# Patient Record
Sex: MALE | NOT HISPANIC OR LATINO | Employment: FULL TIME | URBAN - METROPOLITAN AREA
[De-identification: names, ages, dates, MRNs, and addresses within clinical notes are randomized per-mention and may not be internally consistent; named-entity substitution may affect disease eponyms.]

---

## 2017-01-12 ENCOUNTER — THERAPY VISIT (OUTPATIENT)
Dept: PHYSICAL THERAPY | Facility: CLINIC | Age: 27
End: 2017-01-12
Payer: COMMERCIAL

## 2017-01-12 DIAGNOSIS — M25.811 SHOULDER IMPINGEMENT, RIGHT: Primary | ICD-10-CM

## 2017-01-12 PROCEDURE — 97161 PT EVAL LOW COMPLEX 20 MIN: CPT | Mod: GP | Performed by: PHYSICAL THERAPIST

## 2017-01-12 PROCEDURE — 97035 APP MDLTY 1+ULTRASOUND EA 15: CPT | Mod: GP | Performed by: PHYSICAL THERAPIST

## 2017-01-12 PROCEDURE — 97110 THERAPEUTIC EXERCISES: CPT | Mod: GP | Performed by: PHYSICAL THERAPIST

## 2017-01-12 NOTE — PROGRESS NOTES
[facility] Initial Evaluation    Subjective:    John Roman is a 26 year old male with a right shoulder condition.  Condition occurred with:  Lifting.  Condition occurred: at work.  This is a new condition  Lifting at work and felt pain in shoulder.  Light weight.  About 4 weeks ago.  MD referral 1/5/2017..    Patient reports pain:  Anterior, posterior, medial, lateral and upper trap.  Radiates to:  Cervical and upper arm.  Pain is described as aching and sharp and is intermittent and reported as 4/10.  Associated symptoms:  Loss of strength and loss of motion/stiffness. Pain is worse during the day.  Symptoms are exacerbated by using arm overhead (computer x 15 min) and relieved by rest.  Since onset symptoms are unchanged.        General health as reported by patient is good.  Pertinent medical history includes:  None.    Other surgeries include:  None reported.  Current medications:  None as reported by the patient.  Current occupation  at [employer].  Patient is working in normal job without restrictions.          Red flags:  None as reported by the patient.                      Objective:    Standing Alignment:      Shoulder/UE:  Rounded shoulders and elevated scapula R                                       Shoulder Evaluation:  ROM:  AROM:    Flexion:  Right:  Full with pain > 120    Abduction:  Right:  Full with pain > 90    Internal Rotation:  Right:  Full with pain @ belt                  PROM:  normal  Flexion:  Right: pain > 120      Abduction:  Right:  Pain > 120                          Strength:  : supraspinatus 4/5 with pain.  Flexion: Right: 5/5     Pain:   Extension:  Right: 5/5    Pain:    Adduction:  Right: 5/5     Pain:  Internal Rotation:  Right: 5/5     Pain:  External Rotation:   Right:5/5     Pain:              Stability Testing:  normal      Special Tests:      Right shoulder positive for the following special tests:Impingement  Right shoulder negative for the  following special tests:Labral; Bursal and Rotator cuff tear  Palpation:      Right shoulder tenderness present at: Acrimioclavicular; Supraspinatus; Deltoid; Levator; Rhomboids; Upper Trap and Bicipital Groove                                     General     ROS    Assessment/Plan:      Patient is a 26 year old male with right side shoulder complaints.    Patient has the following significant findings with corresponding treatment plan.                Diagnosis 1:  R shoulder impingement  Pain -  US, manual therapy, self management, education, directional preference exercise and home program  Decreased ROM/flexibility - manual therapy and therapeutic exercise  Decreased strength - therapeutic exercise and therapeutic activities  Inflammation - US  Decreased function - therapeutic activities    Therapy Evaluation Codes:   1) History comprised of:   Personal factors that impact the plan of care:      slight language baaier.    Comorbidity factors that impact the plan of care are:      None.     Medications impacting care: None.  2) Examination of Body Systems comprised of:   Body structures and functions that impact the plan of care:      Shoulder.   Activity limitations that impact the plan of care are:      Lifting and Reading/Computer work.  3) Clinical presentation characteristics are:   Stable/Uncomplicated.  4) Decision-Making    Low complexity using standardized patient assessment instrument and/or measureable assessment of functional outcome.  Cumulative Therapy Evaluation is: Low complexity.    Previous and current functional limitations:  (See Goal Flow Sheet for this information)    Short term and Long term goals: (See Goal Flow Sheet for this information)     Communication ability:  Patient appears to be able to clearly communicate and understand verbal and written communication and follow directions correctly.  Treatment Explanation - The following has been discussed with the patient:   RX ordered/plan of  care  Anticipated outcomes  Possible risks and side effects  This patient would benefit from PT intervention to resume normal activities.   Rehab potential is good.    Frequency:  1 X week, once daily  Duration:  for 6 weeks  Discharge Plan:  Achieve all LTG.  Independent in home treatment program.  Reach maximal therapeutic benefit.    Please refer to the daily flowsheet for treatment today, total treatment time and time spent performing 1:1 timed codes.

## 2017-01-12 NOTE — Clinical Note
[facility]  [address] [address]  [phone]    2017    Re: John Roman   :   1990  MRN:  [id number]   REFERRING PHYSICIAN:   Farhana Greene  [facility] [facility]  2017  Visits: 1 Rxs Used: 1/  Reason for Referral:  Shoulder impingement, right  EVALUATION SUMMARY  [facility] Initial Evaluation  Subjective:  John Roman is a 26 year old male with a right shoulder condition.  Condition occurred with:  Lifting.  Condition occurred: at work.  This is a new condition  Lifting at work and felt pain in shoulder.  Light weight.  About 4 weeks ago.  MD referral 2017..    Patient reports pain:  Anterior, posterior, medial, lateral and upper trap.  Radiates to:  Cervical and upper arm.  Pain is described as aching and sharp and is intermittent and reported as 4/10.  Associated symptoms:  Loss of strength and loss of motion/stiffness. Pain is worse during the day.  Symptoms are exacerbated by using arm overhead (computer x 15 min) and relieved by rest.  Since onset symptoms are unchanged.        General health as reported by patient is good.  Pertinent medical history includes:  None.Other surgeries include:  None reported.  Current medications:  None as reported by the patient.  Current occupation  at [employer].  Patient is working in normal job without restrictions.    Red flags:  None as reported by the patient.  Objective:  Standing Alignment:    Shoulder/UE:  Rounded shoulders and elevated scapula R    Shoulder Evaluation:  ROM:  AROM:    Flexion:  Right:  Full with pain > 120  Abduction:  Right:  Full with pain > 90  Internal Rotation:  Right:  Full with pain @ belt  PROM:  normal  Flexion:  Right: pain > 120    Abduction:  Right:  Pain > 120  Strength:  : supraspinatus 4/5 with pain.  Flexion: Right: 5/5     Pain:   Extension:  Right: 5/5    Pain:  Adduction:   Right: 5/5     Pain:  Internal Rotation:  Right: 5/5     Pain:  External Rotation:   Right:5/5     Pain:    Stability Testing:  normal    Re: John Roman   :   1990  Special Tests:    Right shoulder positive for the following special tests:Impingement  Right shoulder negative for the following special tests:Labral; Bursal and Rotator cuff tear  Palpation:    Right shoulder tenderness present at: Acrimioclavicular; Supraspinatus; Deltoid; Levator; Rhomboids; Upper Trap and Bicipital Groove  General   ROS  Assessment/Plan:    Patient is a 26 year old male with right side shoulder complaints.    Patient has the following significant findings with corresponding treatment plan.                Diagnosis 1:  R shoulder impingement  Pain -  US, manual therapy, self management, education, directional preference exercise and home program  Decreased ROM/flexibility - manual therapy and therapeutic exercise  Decreased strength - therapeutic exercise and therapeutic activities  Inflammation - US  Decreased function - therapeutic activities  Therapy Evaluation Codes:   1) History comprised of:   Personal factors that impact the plan of care:      slight language baaier.    Comorbidity factors that impact the plan of care are:      None.     Medications impacting care: None.  2) Examination of Body Systems comprised of:   Body structures and functions that impact the plan of care:      Shoulder.   Activity limitations that impact the plan of care are:      Lifting and Reading/Computer work.  3) Clinical presentation characteristics are:   Stable/Uncomplicated.  4) Decision-Making    Low complexity using standardized patient assessment instrument and/or measureable assessment of functional outcome.  Cumulative Therapy Evaluation is: Low complexity.  Previous and current functional limitations:  (See Goal Flow Sheet for this information)    Short term and Long term goals: (See Goal Flow Sheet for this information)    Communication ability:  Patient appears to be able to clearly communicate and understand verbal and written communication and follow directions correctly.  Treatment Explanation - The following has been discussed with the patient:   RX ordered/plan of care  Anticipated outcomes  Possible risks and side effects  This patient would benefit from PT intervention to resume normal activities.   Rehab potential is good.  Frequency:  1 X week, once daily  Duration:  for 6 weeks    Re: John Roman   :   1990      Discharge Plan:  Achieve all LTG.  Independent in home treatment program.  Reach maximal therapeutic benefit.  Thank you for your referral.    INQUIRIES  Therapist:Maurizio Horvath PT  [facility]  [address] [address]  Phone: [phone]  Fax: [phone]

## 2017-06-15 PROBLEM — M25.811 SHOULDER IMPINGEMENT, RIGHT: Status: RESOLVED | Noted: 2017-01-12 | Resolved: 2017-06-15

## 2020-12-13 ENCOUNTER — VIRTUAL VISIT (OUTPATIENT)
Dept: FAMILY MEDICINE | Facility: OTHER | Age: 30
End: 2020-12-13

## 2020-12-13 ENCOUNTER — NURSE TRIAGE (OUTPATIENT)
Dept: NURSING | Facility: CLINIC | Age: 30
End: 2020-12-13

## 2020-12-13 NOTE — TELEPHONE ENCOUNTER
Exposure to COVID about four days ago, just found out and is noting that he is now coughing too. Feeling okay otherwise at this time. He tried to do [facility] but was not able to. Gave him info re [facility],care and testing.       COVID 19 Nurse Triage Plan/Patient Instructions    Please be aware that novel coronavirus (COVID-19) may be circulating in the community. If you develop symptoms such as fever, cough, or SOB or if you have concerns about the presence of another infection including coronavirus (COVID-19), please contact your health care provider or visit [email or web address].     Disposition/Instructions    Virtual Visit with provider recommended. Reference Visit Selection Guide.    Thank you for taking steps to prevent the spread of this virus.  o Limit your contact with others.  o Wear a simple mask to cover your cough.  o Wash your hands well and often.    Resources    [facility]: About COVID-19: [email or web address]    CDC: What to Do If You're Sick: www.cdc.gov/coronavirus/2019-ncov/about/steps-when-sick.html    CDC: Ending Home Isolation: www.cdc.gov/coronavirus/2019-ncov/hcp/disposition-in-home-patients.html     CDC: Caring for Someone: www.cdc.gov/coronavirus/2019-ncov/if-you-are-sick/care-for-someone.html     [facility]: Interim Guidance for Hospital Discharge to Home: www.health.[facility].mn.us/diseases/coronavirus/hcp/hospdischarge.pdf    [facility] clinical trials (COVID-19 research studies): [email or web address]-trials     Below are the COVID-19 hotlines at the [place] [facility] of [facility] ([facility]). Interpreters are available.   o For health questions: Call 847-686-8613 or 1-267.411.9642 (7 a.m. to 7 p.m.)  o For questions about schools and childcare: Call 530-647-0991 or 1-405.350.1851 (7 a.m. to 7 p.m.)                     Reason for Disposition    [1] COVID-19 infection suspected by caller or triager AND [2] mild symptoms (cough, fever, or others) AND [3] no  complications or SOB    Additional Information    Negative: SEVERE difficulty breathing (e.g., struggling for each breath, speaks in single words)    Negative: Difficult to awaken or acting confused (e.g., disoriented, slurred speech)    Negative: Bluish (or gray) lips or face now    Negative: Shock suspected (e.g., cold/pale/clammy skin, too weak to stand, low BP, rapid pulse)    Negative: Sounds like a life-threatening emergency to the triager    Negative: [1] COVID-19 exposure AND [2] no symptoms    Negative: [1] Lives with someone known to have influenza (flu test positive) AND [2] flu-like symptoms (e.g., cough, runny nose, sore throat, SOB; with or without fever)    Negative: [1] Adult with possible COVID-19 symptoms AND [2] triager concerned about severity of symptoms or other causes    Negative: Immunization reaction suspected (e.g., fever, headache, muscle aches occurring during days 1-3 days after immunization)    Negative: COVID-19 and breastfeeding, questions about    Negative: SEVERE or constant chest pain or pressure (Exception: mild central chest pain, present only when coughing)    Negative: MODERATE difficulty breathing (e.g., speaks in phrases, SOB even at rest, pulse 100-120)    Negative: [1] Headache AND [2] stiff neck (can't touch chin to chest)    Negative: MILD difficulty breathing (e.g., minimal/no SOB at rest, SOB with walking, pulse <100)    Negative: Chest pain or pressure    Negative: Patient sounds very sick or weak to the triager    Negative: Fever > 103 F (39.4 C)    Negative: [1] Fever > 101 F (38.3 C) AND [2] age > 60    Negative: [1] Fever > 100.0 F (37.8 C) AND [2] bedridden (e.g., nursing home patient, CVA, chronic illness, recovering from surgery)    Negative: [1] HIGH RISK patient (e.g., age > 64 years, diabetes, heart or lung disease, weak immune system) AND [2] new or worsening symptoms    Negative: [1] HIGH RISK patient AND [2] influenza is widespread in the community AND [3]  ONE OR MORE respiratory symptoms: cough, sore throat, runny or stuffy nose    Negative: [1] HIGH RISK patient AND [2] influenza exposure within the last 7 days AND [3] ONE OR MORE respiratory symptoms: cough, sore throat, runny or stuffy nose    Negative: Fever present > 3 days (72 hours)    Negative: [1] Fever returns after gone for over 24 hours AND [2] symptoms worse or not improved    Negative: [1] Continuous (nonstop) coughing interferes with work or school AND [2] no improvement using cough treatment per protocol    Protocols used: CORONAVIRUS (COVID-19) DIAGNOSED OR [id number] 12.1

## 2020-12-13 NOTE — PROGRESS NOTES
"Date: 2020 16:16:54  Clinician: Kindra Adams  Clinician NPI: [id number]  Patient: akhil garcia  Patient : 1990  Patient Address: [address] [place] [address]  Patient Phone: [phone]  Visit Protocol: URI  Patient Summary:  akhil is a 30 year old ( : 1990 ) male who initiated a OnCare Visit for COVID-19 (Coronavirus) evaluation and screening. When asked the question \"Please sign me up to receive news, health information and promotions. \", akhil responded \"No\".    akhil states his symptoms started 1-2 days ago.   His symptoms consist of a headache, a cough, vomiting, rhinitis, facial pain or pressure, myalgia, chills, malaise, ageusia, and anosmia.   Symptom details     Nasal secretions: The color of his mucus is yellow.    Cough: akhil coughs a few times an hour and his cough is more bothersome at night. Phlegm comes into his throat when he coughs. He believes his cough is caused by post-nasal drip. The color of the phlegm is white.     Facial pain or pressure: The facial pain or pressure feels worse when bending over or leaning forward.     Headache: He states the headache is moderate (4-6 on a 10 point pain scale).      akhil denies having ear pain, wheezing, fever, nasal congestion, nausea, sore throat, teeth pain, and diarrhea. He also denies taking antibiotic medication in the past month, having recent facial or sinus surgery in the past 60 days, and having a sinus infection within the past year.   Precipitating events  He has recently been exposed to someone with influenza. akhil has not been in close contact with any high risk individuals.   Pertinent COVID-19 (Coronavirus) information  akhil does not work or volunteer as healthcare worker or a . In the past 14 days, akhil has not worked or volunteered at a healthcare facility or group living setting.   In the past 14 days, he also has not lived in a congregate living setting.   akhil has had a close " contact with a laboratory-confirmed COVID-19 patient within 14 days of symptom onset. He was exposed at his work. He does not know when he was exposed to the laboratory-confirmed COVID-19 patient.   Additional information about contact with COVID-19 (Coronavirus) patient as reported by the patient (free text): I believe from my friend    Since December 2019, akhil has not been tested for COVID-19 and has had upper respiratory infection (URI) or influenza-like illness.      Date(s) of previous URI or influenza-like illness (free-text): 12/10/2020     Symptoms akhil experienced during previous URI or influenza-like illness as reported by the patient (free-text): Flu        Triage Point(s) temporarily suspended for COVID-19 (Coronavirus) screening  akhil reported the following symptoms/conditions. These are protocol referral points that have temporarily been removed for purposes of COVID-19 (Coronavirus) screening.   Difficulty breathing even when resting and can only speak in phrase(s)   Pertinent medical history  He has not been told by his provider to avoid NSAIDs.   akhil does not have diabetes. He denies having immunosuppressive conditions (e.g., chemotherapy, HIV, organ transplant, long-term use of steroids or other immunosuppressive medications, splenectomy). He does not have severe COPD and congestive heart failure. He does not have asthma.   akhil needs a return to work/school note.   Weight: 185 lbs   akhil smokes or uses smokeless tobacco.   Weight: 185 lbs  A synchronous phone visit was initiated by the provider for the following reason: needs further evaluation of shortness of breath    MEDICATIONS: No current medications, ALLERGIES: NKDA  Clinician Response:  Dear akhil,  I am sorry you are not feeling well. To determine the most appropriate care for you, I would like you to be seen in person to further discuss your health history and symptoms.  You will not be charged for this OnCare Visit. Thank you for  trusting us with your care.  COVID-19 (Coronavirus) General Information  Because there is currently no vaccine to prevent infection, the best way to protect yourself is to avoid being exposed to this virus. Common symptoms of COVID-19 include but are not limited to fever, cough, and shortness of breath. These symptoms appear 2-14 days after you are exposed to the virus that causes COVID-19. Click here for more information from the CDC on how to protect yourself.  If you are sick with COVID-19 or suspect you are infected with the virus that causes COVID-19, follow the steps here from the CDC to help prevent the disease from spreading to people in your home and community.  Click here for general information from the CDC on testing.  If you develop any of these emergency warning signs for COVID-19, get medical attention immediately:     Trouble breathing    Persistent pain or pressure in the chest    New confusion or inability to arouse    Bluish lips or face      Call your doctor or clinic before going in. Call 911 if you have a medical emergency and notify the  you have or think you may have COVID-19.  For more detailed and up to date information on COVID-19 (Coronavirus), please visit the CDC website.   Diagnosis: Refer for additional evaluation  Diagnosis ICD: R69  Triage Notes: I reviewed the patient's history, verified their identity, and explained the OnCare Visit process.    Patient reports moderate shortness of breath even at rest and he would like COVID testing. Discussed this is his 5th Oncare visit today and he has been referred for in person evaluation 4 times already today due to his shortness of breath at rest. Recommended in person evaluation in urgent care or emergency room today and patient is agreeable.  Synchronous Triage: phone, status: completed, duration: 141 seconds

## 2020-12-27 ENCOUNTER — HEALTH MAINTENANCE LETTER (OUTPATIENT)
Age: 30
End: 2020-12-27

## 2021-10-04 ENCOUNTER — HEALTH MAINTENANCE LETTER (OUTPATIENT)
Age: 31
End: 2021-10-04

## 2022-01-23 ENCOUNTER — HEALTH MAINTENANCE LETTER (OUTPATIENT)
Age: 32
End: 2022-01-23

## 2022-09-11 ENCOUNTER — HEALTH MAINTENANCE LETTER (OUTPATIENT)
Age: 32
End: 2022-09-11

## 2022-10-01 ENCOUNTER — OFFICE VISIT (OUTPATIENT)
Dept: URGENT CARE | Facility: URGENT CARE | Age: 32
End: 2022-10-01
Payer: COMMERCIAL

## 2022-10-01 VITALS
OXYGEN SATURATION: 98 % | DIASTOLIC BLOOD PRESSURE: 75 MMHG | WEIGHT: 196.8 LBS | TEMPERATURE: 97 F | SYSTOLIC BLOOD PRESSURE: 111 MMHG | HEART RATE: 60 BPM

## 2022-10-01 DIAGNOSIS — R07.0 THROAT PAIN: ICD-10-CM

## 2022-10-01 DIAGNOSIS — B34.9 VIRAL SYNDROME: Primary | ICD-10-CM

## 2022-10-01 DIAGNOSIS — R52 BODY ACHES: ICD-10-CM

## 2022-10-01 LAB
DEPRECATED S PYO AG THROAT QL EIA: NEGATIVE
FLUAV AG SPEC QL IA: NEGATIVE
FLUBV AG SPEC QL IA: NEGATIVE
GROUP A STREP BY PCR: NOT DETECTED

## 2022-10-01 PROCEDURE — U0003 INFECTIOUS AGENT DETECTION BY NUCLEIC ACID (DNA OR RNA); SEVERE ACUTE RESPIRATORY SYNDROME CORONAVIRUS 2 (SARS-COV-2) (CORONAVIRUS DISEASE [COVID-19]), AMPLIFIED PROBE TECHNIQUE, MAKING USE OF HIGH THROUGHPUT TECHNOLOGIES AS DESCRIBED BY CMS-2020-01-R: HCPCS | Performed by: INTERNAL MEDICINE

## 2022-10-01 PROCEDURE — 99203 OFFICE O/P NEW LOW 30 MIN: CPT | Mod: CS | Performed by: INTERNAL MEDICINE

## 2022-10-01 PROCEDURE — 87651 STREP A DNA AMP PROBE: CPT | Performed by: INTERNAL MEDICINE

## 2022-10-01 PROCEDURE — 87804 INFLUENZA ASSAY W/OPTIC: CPT | Performed by: INTERNAL MEDICINE

## 2022-10-01 PROCEDURE — U0005 INFEC AGEN DETEC AMPLI PROBE: HCPCS | Performed by: INTERNAL MEDICINE

## 2022-10-01 ASSESSMENT — ENCOUNTER SYMPTOMS
DIARRHEA: 0
CHILLS: 0
DIAPHORESIS: 0
FEVER: 0

## 2022-10-01 NOTE — PROGRESS NOTES
ASSESSMENT AND PLAN:      ICD-10-CM    1. Viral syndrome  B34.9    2. Throat pain  R07.0 Streptococcus A Rapid Screen w/Reflex to PCR - Clinic Collect     Group A Streptococcus PCR Throat Swab   3. Body aches  R52 Influenza A & B Antigen - Clinic Collect     Symptomatic; Yes; 9/24/2022 COVID-19 Virus (Coronavirus) by PCR Nose     PLAN:  URI Adult:  Fluids, Rest and Saline gargles      Return if symptoms worsen or fail to improve.        Cuca Rosenbaum MD  [facility] URGENT CARE    Subjective     John Roman is a 32 year old who presents for Patient presents with:  Throat Pain: X1 week  Cough  Headache  Generalized Body Aches    an established patient of [facility].    URI Adult    Onset of symptoms was 1 week(s) ago.  Current and Associated symptoms: runny nose, stuffy nose, cough - non-productive, sore throat, hoarse voice, headache and body aches  Predisposing factors include ill contact: Family member  and Work.        Review of Systems   Constitutional: Negative for chills, diaphoresis and fever.   Gastrointestinal: Negative for diarrhea.           Objective    /75   Pulse 60   Temp 97  F (36.1  C) (Tympanic)   Wt 89.3 kg (196 lb 12.8 oz)   SpO2 98%   Physical Exam  Vitals reviewed.   Constitutional:       Appearance: Normal appearance.   HENT:      Right Ear: Tympanic membrane normal. There is impacted cerumen.      Left Ear: Tympanic membrane normal. There is impacted cerumen.      Mouth/Throat:      Mouth: Mucous membranes are moist.      Pharynx: Oropharynx is clear.   Eyes:      Conjunctiva/sclera: Conjunctivae normal.   Cardiovascular:      Rate and Rhythm: Normal rate and regular rhythm.      Pulses: Normal pulses.      Heart sounds: Normal heart sounds.   Pulmonary:      Effort: Pulmonary effort is normal.      Breath sounds: Normal breath sounds.   Neurological:      Mental Status: He is alert.            Results for orders placed or performed in visit on 10/01/22 (from the past  24 hour(s))   Streptococcus A Rapid Screen w/Reflex to PCR - Clinic Collect    Specimen: Throat; Swab   Result Value Ref Range    Group A Strep antigen Negative Negative   Influenza A & B Antigen - Clinic Collect    Specimen: Nose; Swab   Result Value Ref Range    Influenza A antigen Negative Negative    Influenza B antigen Negative Negative    Narrative    Test results must be correlated with clinical data. If necessary, results should be confirmed by a molecular assay or viral culture.

## 2022-10-02 LAB — SARS-COV-2 RNA RESP QL NAA+PROBE: NEGATIVE

## 2023-04-07 ENCOUNTER — OFFICE VISIT (OUTPATIENT)
Dept: URGENT CARE | Facility: URGENT CARE | Age: 33
End: 2023-04-07
Payer: COMMERCIAL

## 2023-04-07 VITALS
DIASTOLIC BLOOD PRESSURE: 72 MMHG | HEART RATE: 51 BPM | SYSTOLIC BLOOD PRESSURE: 119 MMHG | TEMPERATURE: 97.2 F | WEIGHT: 199 LBS | OXYGEN SATURATION: 100 %

## 2023-04-07 DIAGNOSIS — S16.1XXA STRAIN OF NECK MUSCLE, INITIAL ENCOUNTER: Primary | ICD-10-CM

## 2023-04-07 PROCEDURE — 99213 OFFICE O/P EST LOW 20 MIN: CPT | Performed by: PHYSICIAN ASSISTANT

## 2023-04-07 ASSESSMENT — ENCOUNTER SYMPTOMS
NECK PAIN: 1
FEVER: 0
CARDIOVASCULAR NEGATIVE: 1
CONSTITUTIONAL NEGATIVE: 1
WHEEZING: 0
HEMATURIA: 0
COUGH: 0
ALLERGIC/IMMUNOLOGIC NEGATIVE: 1
HEADACHES: 0
ABDOMINAL PAIN: 0
NAUSEA: 0
FREQUENCY: 0
DYSURIA: 0
GASTROINTESTINAL NEGATIVE: 1
CHEST TIGHTNESS: 0
VOMITING: 0
DIARRHEA: 0
SHORTNESS OF BREATH: 0
CHILLS: 0
SORE THROAT: 0
RESPIRATORY NEGATIVE: 1
PALPITATIONS: 0
NEUROLOGICAL NEGATIVE: 1
MYALGIAS: 0

## 2023-04-07 NOTE — PROGRESS NOTES
Chief Complaint:    Chief Complaint   Patient presents with     Neck Pain     Brushing hair and ever since has had neck pain x today     Medical Decision Making:    Vital signs reviewed by Wesly Gama PA-C  /72   Pulse 51   Temp 97.2  F (36.2  C) (Tympanic)   Wt 90.3 kg (199 lb)   SpO2 100%     Differential Diagnosis: Muscle strain, neck sprain      ASSESSMENT:     1. Strain of neck muscle, initial encounter           PLAN:     Patient is in no acute distress.  Neuro exam was benign.   Rx for Voltaren gel.     Patient instructed to follow up with PCP in 1 week if symptoms are not improving.  Sooner if symptoms worsen.  Worrisome symptoms discussed with instructions to go to the ED.  Patient verbalized understanding and agreed with this plan.    Labs:     No results found for any visits on 04/07/23.    Current Meds:    Current Outpatient Medications:      diclofenac (VOLTAREN) 1 % topical gel, Apply 4 g topically 4 times daily, Disp: 50 g, Rfl: 0     omeprazole (PRILOSEC) 20 MG DR capsule, Take 20 mg by mouth daily, Disp: , Rfl:     Allergies:  No Known Allergies    SUBJECTIVE    HPI: John Roman is an 33 year old male who presents for evaluation and treatment of neck pain. Pain started this morning while he was brushing his hair. Pain described as sharp and rated 10/10 in severity. Pain does not radiate. He has not taken anything for the pain because he is currently fasting for Ramadan. Denies any history of neck injury or surgery.     ROS:      Review of Systems   Constitutional: Negative.  Negative for chills and fever.   HENT: Negative.  Negative for sore throat.    Respiratory: Negative.  Negative for cough, chest tightness, shortness of breath and wheezing.    Cardiovascular: Negative.  Negative for chest pain and palpitations.   Gastrointestinal: Negative.  Negative for abdominal pain, diarrhea, nausea and vomiting.   Genitourinary: Negative for dysuria, frequency, hematuria and urgency.    Musculoskeletal: Positive for neck pain. Negative for myalgias.   Skin: Negative for rash.   Allergic/Immunologic: Negative.  Negative for immunocompromised state.   Neurological: Negative.  Negative for headaches.        Family History   No family history on file.    Social History  Social History     Socioeconomic History     Marital status: Single     Spouse name: Not on file     Number of children: Not on file     Years of education: Not on file     Highest education level: Not on file   Occupational History     Not on file   Tobacco Use     Smoking status: Never     Smokeless tobacco: Never   Vaping Use     Vaping status: Not on file   Substance and Sexual Activity     Alcohol use: Not on file     Drug use: Not on file     Sexual activity: Not on file   Other Topics Concern     Not on file   Social History Narrative     Not on file     Social Determinants of Health     Financial Resource Strain: Not on file   Food Insecurity: Not on file   Transportation Needs: Not on file   Physical Activity: Not on file   Stress: Not on file   Social Connections: Not on file   Intimate Partner Violence: Not on file   Housing Stability: Not on file        Surgical History:  No past surgical history on file.     Problem List:  Patient Active Problem List   Diagnosis   (none) - all problems resolved or deleted           OBJECTIVE:     Vital signs noted and reviewed by Wesly Gama PA-C  /72   Pulse 51   Temp 97.2  F (36.2  C) (Tympanic)   Wt 90.3 kg (199 lb)   SpO2 100%      PEFR:    Physical Exam  Vitals and nursing note reviewed.   Constitutional:       General: He is not in acute distress.     Appearance: He is well-developed. He is not ill-appearing, toxic-appearing or diaphoretic.   HENT:      Head: Normocephalic and atraumatic.      Right Ear: Hearing, tympanic membrane, ear canal and external ear normal. Tympanic membrane is not perforated, erythematous, retracted or bulging.      Left Ear: Hearing,  tympanic membrane, ear canal and external ear normal. Tympanic membrane is not perforated, erythematous, retracted or bulging.      Nose: Nose normal. No mucosal edema, congestion or rhinorrhea.      Mouth/Throat:      Pharynx: No oropharyngeal exudate or posterior oropharyngeal erythema.      Tonsils: No tonsillar exudate or tonsillar abscesses. 0 on the right. 0 on the left.   Eyes:      Pupils: Pupils are equal, round, and reactive to light.   Neck:     Cardiovascular:      Rate and Rhythm: Normal rate and regular rhythm.      Heart sounds: Normal heart sounds, S1 normal and S2 normal. Heart sounds not distant. No murmur heard.     No friction rub. No gallop.   Pulmonary:      Effort: Pulmonary effort is normal. No respiratory distress.      Breath sounds: Normal breath sounds. No decreased breath sounds, wheezing, rhonchi or rales.   Abdominal:      General: Bowel sounds are normal. There is no distension.      Palpations: Abdomen is soft.      Tenderness: There is no abdominal tenderness.   Musculoskeletal:      Cervical back: Normal range of motion and neck supple. Tenderness present. No edema, erythema, signs of trauma, rigidity or crepitus. Pain with movement and muscular tenderness present. No spinous process tenderness. Normal range of motion.   Lymphadenopathy:      Cervical: No cervical adenopathy.   Skin:     General: Skin is warm and dry.      Findings: No rash.   Neurological:      Mental Status: He is alert.      Cranial Nerves: No cranial nerve deficit.      Sensory: Sensation is intact. No sensory deficit.      Motor: Motor function is intact. No weakness, atrophy or abnormal muscle tone.      Coordination: Coordination is intact. Coordination normal.      Gait: Gait is intact. Gait normal.   Psychiatric:         Attention and Perception: He is attentive.         Speech: Speech normal.         Behavior: Behavior normal. Behavior is cooperative.         Thought Content: Thought content normal.          Judgment: Judgment normal.             Wesly Gama PA-C  4/7/2023, 3:31 PM

## 2023-04-30 ENCOUNTER — HEALTH MAINTENANCE LETTER (OUTPATIENT)
Age: 33
End: 2023-04-30

## 2023-05-18 ENCOUNTER — OFFICE VISIT (OUTPATIENT)
Dept: FAMILY MEDICINE | Facility: CLINIC | Age: 33
End: 2023-05-18
Payer: COMMERCIAL

## 2023-05-18 VITALS
WEIGHT: 195.8 LBS | DIASTOLIC BLOOD PRESSURE: 78 MMHG | SYSTOLIC BLOOD PRESSURE: 121 MMHG | BODY MASS INDEX: 28.03 KG/M2 | HEART RATE: 68 BPM | RESPIRATION RATE: 16 BRPM | HEIGHT: 70 IN | OXYGEN SATURATION: 99 % | TEMPERATURE: 97.7 F

## 2023-05-18 DIAGNOSIS — G57.11 MERALGIA PARAESTHETICA, RIGHT: Primary | ICD-10-CM

## 2023-05-18 PROCEDURE — 99213 OFFICE O/P EST LOW 20 MIN: CPT | Performed by: INTERNAL MEDICINE

## 2023-05-18 RX ORDER — NAPROXEN 500 MG/1
TABLET ORAL
COMMUNITY
Start: 2023-05-10 | End: 2024-01-17

## 2023-05-18 RX ORDER — CIPROFLOXACIN 500 MG/1
TABLET, FILM COATED ORAL
COMMUNITY
End: 2024-02-07

## 2023-05-18 RX ORDER — SULFAMETHOXAZOLE/TRIMETHOPRIM 800-160 MG
TABLET ORAL
COMMUNITY
End: 2023-05-18

## 2023-05-18 RX ORDER — NAPROXEN 500 MG/1
500 TABLET ORAL
COMMUNITY
Start: 2023-05-10

## 2023-05-18 ASSESSMENT — PAIN SCALES - GENERAL: PAINLEVEL: SEVERE PAIN (7)

## 2023-05-18 NOTE — PROGRESS NOTES
"  Assessment & Plan     Meralgia paraesthetica, right  Ongoing pain in the right thigh  Pain is on the lateral aspect of the thigh  Burning in nature  Also has some numbness at times  No history of trauma  Pain does not radiate from his back  He did see a pain specialist in the past and was given some steroid shots in the thigh  Examination shows that he has got reduced sensation to pinprick and touch in the lateral cutaneous nerve distribution  He most probably has got meralgia paresthetica  Not clear what the cause of this is  He is not obese  Does not meet any tight fitting jeans  No history of any surgeries or trauma  He was placed on gabapentin in the past but did not tolerate this  We will first get neurology opinion to confirm the diagnosis and after that we might have to consider lateral cutaneous nerve block  - Adult Neurology  Referral; Future      25 minutes spent by me on the date of the encounter doing chart review, history and exam, documentation and further activities per the note       Nicotine/Tobacco Cessation:  He reports that he has been smoking cigarettes. He has never used smokeless tobacco.  Nicotine/Tobacco Cessation Plan:   Information offered: Patient not interested at this time      BMI:   Estimated body mass index is 28.09 kg/m  as calculated from the following:    Height as of this encounter: 1.778 m (5' 10\").    Weight as of this encounter: 88.8 kg (195 lb 12.8 oz).           Kevan White MD  [facility] YAYA Lopez is a 33 year old, presenting for the following health issues:  Musculoskeletal Problem        5/18/2023    11:10 AM   Additional Questions   Roomed by abigail     History of Present Illness       Reason for visit:  Leg pain and i fell dezy    He eats 0-1 servings of fruits and vegetables daily.He consumes 2 sweetened beverage(s) daily.He exercises with enough effort to increase his heart rate 9 or less minutes per day.  He " "exercises with enough effort to increase his heart rate 3 or less days per week. He is missing 3 dose(s) of medications per week.         Leg pain ongoing couple years, feels like his leg is burning with shapr knife like pain       Review of Systems   Constitutional, HEENT, cardiovascular, pulmonary, gi and gu systems are negative, except as otherwise noted.      Objective    /78 (BP Location: Left arm, Patient Position: Sitting, Cuff Size: Adult Regular)   Pulse 68   Temp 97.7  F (36.5  C) (Oral)   Resp 16   Ht 1.778 m (5' 10\")   Wt 88.8 kg (195 lb 12.8 oz)   SpO2 99%   BMI 28.09 kg/m    Body mass index is 28.09 kg/m .  Physical Exam   GENERAL: healthy, alert and no distress  EYES: Eyes grossly normal to inspection, PERRL and conjunctivae and sclerae normal  RESP: lungs clear to auscultation - no rales, rhonchi or wheezes  MS: no gross musculoskeletal defects noted, no edema  NEURO:  reduced sensation to pinprick and touch in the lateral cutaneous nerve distribution on the right side  Pulmonary slightly less only 4 out of 5 in the hip flexors and extensors                      "

## 2023-05-18 NOTE — PATIENT INSTRUCTIONS
At [facility], we strive to deliver an exceptional experience to you, every time we see you. If you receive a survey, please complete it as we do value your feedback.  If you have MyChart, you can expect to receive results automatically within 24 hours of their completion.  Your provider will send a note interpreting your results as well.   If you do not have MyChart, you should receive your results in about a week by mail.    Your care team:                            Family Medicine Internal Medicine   MD Jerome Gagnon MD Shantel Branch-Fleming, MD Srinivasa Vaka, MD Katya Belousova, PAJENNIE Araiza CNP, MD ([facility]   Andres Peralta, MD Janice Livingston MD Amelia Massimini APRN VINITA Negrete APRN MD Justyn Petty MD          Clinic hours: Monday - Thursday 7 am-6 pm; Fridays 7 am-5 pm.   Urgent care: Monday - Friday 10 am- 8 pm; Saturday and Sunday 9 am-5 pm.    Clinic: [phone]       [facility]: Monday - Thursday 8 am - 7 pm; Friday 8 am - 6 pm  [facility]: [phone]

## 2023-05-24 NOTE — PROGRESS NOTES
[facility]/[place]  Section of General Neurology  New Patient Visit      John Roman MRN# [id number]   Age: 33 year old YOB: 1990              Assessment and Plan:   John Roman is a pleasant 33 year old man who presents in consultation.  He has had three years of Right lateral thigh pain and now is present to an extent on the left.  It is worsening.  I agree with the diagnosis of meralgia paresthetica.  I showed him examples of this nerve and it's path and he agrees with this diagnosis as well.  I see no evidence of diffuse neuropathy or lumbar radiculopathy to history or exam.  Discussed treatment entails trialing medications, possible next steps re trialing nerve blocks/injections via our pain clinic potentially.  He would prefer medication management for now.   Also discussed conservative measures such as avoiding tight fitting clothing/tight belts around the waste, etc.  Lyrica: to uptitrate to 50 mg TID, can go higher from their pending toleration (previously tolerated gabapentin but did not derive benefit)  Future options discussed: cymbalta, nortriptyline      We will follow up in ~4 months.  He will reach out via bulletn. to let us know how he is doing/should tinkering with medications be requisite in between visits.              Mazin Paul MD   of Neurology   [facility]      History of Presenting Symptoms:   John Roman is a 33 year old male who presents today for evaluation of R and to a degree L thigh pain    He showed me were it hurts.  Cortisone wasn't helping, not ultrasound guided when trialed previously  Nothing makes it better or worse besides driving potentially.  Rubbing it can help.   Gabapentin didn't work.   He notes no back pain associated  No other associated symptoms.  Mostly healthy otherwise.      Lives in [place].    Referring note reviewed (Dr. White):  Meralgia paraesthetica, right  Ongoing  "pain in the right thigh  Pain is on the lateral aspect of the thigh  Burning in nature  Also has some numbness at times  No history of trauma  Pain does not radiate from his back  He did see a pain specialist in the past and was given some steroid shots in the thigh  Examination shows that he has got reduced sensation to pinprick and touch in the lateral cutaneous nerve distribution  He most probably has got meralgia paresthetica  Not clear what the cause of this is  He is not obese  Does not meet any tight fitting jeans  No history of any surgeries or trauma  He was placed on gabapentin in the past but did not tolerate this  We will first get neurology opinion to confirm the diagnosis and after that we might have to consider lateral cutaneous nerve block    Past Medical History:     Patient Active Problem List   Diagnosis   (none) - all problems resolved or deleted     No past medical history on file.     Past Surgical History:   No past surgical history on file.     Social History:     Social History     Tobacco Use     Smoking status: Every Day     Types: Cigarettes     Smokeless tobacco: Never   Vaping Use     Vaping status: Every Day     Substances: Nicotine        Family History:   No family history on file.     Medications:     Current Outpatient Medications   Medication Sig     ciprofloxacin (CIPRO) 500 MG tablet ciprofloxacin 500 mg tablet   TAKE 1 TABLET BY MOUTH EVERY 12 HOURS FOR 14 DAYS     naproxen (NAPROSYN) 500 MG tablet Take 500 mg by mouth     naproxen (NAPROSYN) 500 MG tablet      No current facility-administered medications for this visit.        Allergies:   No Known Allergies     Review of Systems:   As noted above     Physical Exam:   Vitals: /79   Pulse 74   Ht 1.778 m (5' 10\")   Wt 88.5 kg (195 lb)   BMI 27.98 kg/m       Neuro:   General Appearance: No apparent distress, well-nourished, well-groomed, pleasant     Mental Status: Alert and oriented to person, place, and time. Speech " fluent and comprehension intact. No dysarthria.     Cranial Nerves:   II: Visual fields: normal  III: Pupils: 3 mm, equal, round, reactive to light   III,IV,VI: Extraocular Movements: intact   V: Facial sensation: intact to light touch  VII: Facial strength: intact without asymmetry       Motor Exam:   Upper Extremities  Deltoid  Bicep  Tricep  Wrist Extensors  strength Intrinsic Muscles    Right  5  5  5  5 5 5    Left  5  5  5  5 5 5      Lower Extremities  Hip Flexors  Knee Extensors  Knee   Flexors  Dorsi Flexion  Plantar   Flexion    Right  5  5  5  5  5    Left  5  5  5  5  5    5/5 abduction and adduction of lower extremities as well    Sensory: intact to light touch, vibration, and pinprick throughout with exception of feeling PP less in region of lateral femoral cutaneous nerve bilaterally    Coordination: no dysmetria with finger-to-nose bilaterally    Reflexes: biceps, triceps, brachioradialis, patellar, and ankle jerks 2+ and symmetric. Toes are downgoing bilaterally

## 2023-05-24 NOTE — TELEPHONE ENCOUNTER
RECORDS RECEIVED FROM: Internal   REASON FOR VISIT: Meralgia paraesthetica, right   Date of Appt: 05/25/2023   NOTES (FOR ALL VISITS) STATUS DETAILS   OFFICE NOTE from referring provider Internal 05/18/2023 Dr White [facility]    OFFICE NOTE from other specialist Care Everywhere 06/21/2021 [facility]  03/24/2021 [facility]    DISCHARGE SUMMARY from hospital N/A    DISCHARGE REPORT from the ER N/A    OPERATIVE REPORT N/A    [facility] (MS ONLY) N/A    EMG N/A    EEG N/A    MEDICATION LIST N/A    IMAGING  (FOR ALL VISITS)     LUMBAR PUNCTURE N/A    CHERRIE SCAN (MOVEMENT) N/A    ULTRASOUND (CAROTID BILAT) *VASCULAR* N/A    MRI (HEAD, NECK, SPINE) Received 04/19/2021 lumbar spine   CT (HEAD, NECK, SPINE) N/A       Images in PACS

## 2023-05-25 ENCOUNTER — OFFICE VISIT (OUTPATIENT)
Dept: NEUROLOGY | Facility: CLINIC | Age: 33
End: 2023-05-25
Attending: INTERNAL MEDICINE
Payer: COMMERCIAL

## 2023-05-25 ENCOUNTER — PRE VISIT (OUTPATIENT)
Dept: NEUROLOGY | Facility: CLINIC | Age: 33
End: 2023-05-25

## 2023-05-25 VITALS
HEIGHT: 70 IN | HEART RATE: 74 BPM | WEIGHT: 195 LBS | SYSTOLIC BLOOD PRESSURE: 127 MMHG | DIASTOLIC BLOOD PRESSURE: 79 MMHG | BODY MASS INDEX: 27.92 KG/M2

## 2023-05-25 DIAGNOSIS — G57.11 MERALGIA PARAESTHETICA, RIGHT: ICD-10-CM

## 2023-05-25 PROCEDURE — 99204 OFFICE O/P NEW MOD 45 MIN: CPT | Performed by: STUDENT IN AN ORGANIZED HEALTH CARE EDUCATION/TRAINING PROGRAM

## 2023-05-25 RX ORDER — PREGABALIN 50 MG/1
50 CAPSULE ORAL 3 TIMES DAILY
Qty: 90 CAPSULE | Refills: 4 | Status: SHIPPED | OUTPATIENT
Start: 2023-05-25 | End: 2023-09-27

## 2023-05-25 NOTE — PATIENT INSTRUCTIONS
I agree with the diagnosis of meralgia parestheica (lateral femoral cutaneous nerve syndrome)    Avoid tight belts, position that worse it    Treatment:  Nerve pain pills  Sometimes injections help    Lyrica: start with 50 mg at night (1 pill) for 3-4 days then go to twice a day for a week then go to three times a day or go 1 pill in the morning 2 at night.      Next options:  cymbalta, nortriptyline nerve pain agents, injections.     Mychart with issues or questions See me back in ~4 months

## 2023-05-25 NOTE — LETTER
5/25/2023         RE: John Roman  [address]  [place] [facility] [address]        Dear Colleague,    Thank you for referring your patient, John Roman, to the [facility] [place]. Please see a copy of my visit note below.    [facility]/[place]  Section of General Neurology  New Patient Visit      John Roman MRN# [id number]   Age: 33 year old YOB: 1990              Assessment and Plan:   John Roman is a pleasant 33 year old man who presents in consultation.  He has had three years of Right lateral thigh pain and now is present to an extent on the left.  It is worsening.  I agree with the diagnosis of meralgia paresthetica.  I showed him examples of this nerve and it's path and he agrees with this diagnosis as well.  I see no evidence of diffuse neuropathy or lumbar radiculopathy to history or exam.  Discussed treatment entails trialing medications, possible next steps re trialing nerve blocks/injections via our pain clinic potentially.  He would prefer medication management for now.   Also discussed conservative measures such as avoiding tight fitting clothing/tight belts around the waste, etc.  Lyrica: to uptitrate to 50 mg TID, can go higher from their pending toleration (previously tolerated gabapentin but did not derive benefit)  Future options discussed: cymbalta, nortriptyline      We will follow up in ~4 months.  He will reach out via [employer] to let us know how he is doing/should tinkering with medications be requisite in between visits.              Mazin Paul MD   of Neurology   [facility] / [facility]      History of Presenting Symptoms:   John Roman is a 33 year old male who presents today for evaluation of R and to a degree L thigh pain    He showed me were it hurts.  Cortisone wasn't helping, not ultrasound guided when trialed previously  Nothing makes it better or worse besides driving potentially.  Rubbing it  can help.   Gabapentin didn't work.   He notes no back pain associated  No other associated symptoms.  Mostly healthy otherwise.      Lives in [place]  Runs a restaurant.    Referring note reviewed (Dr. White):  Meralgia paraesthetica, right  Ongoing pain in the right thigh  Pain is on the lateral aspect of the thigh  Burning in nature  Also has some numbness at times  No history of trauma  Pain does not radiate from his back  He did see a pain specialist in the past and was given some steroid shots in the thigh  Examination shows that he has got reduced sensation to pinprick and touch in the lateral cutaneous nerve distribution  He most probably has got meralgia paresthetica  Not clear what the cause of this is  He is not obese  Does not meet any tight fitting jeans  No history of any surgeries or trauma  He was placed on gabapentin in the past but did not tolerate this  We will first get neurology opinion to confirm the diagnosis and after that we might have to consider lateral cutaneous nerve block    Past Medical History:     Patient Active Problem List   Diagnosis   (none) - all problems resolved or deleted     No past medical history on file.     Past Surgical History:   No past surgical history on file.     Social History:     Social History     Tobacco Use     Smoking status: Every Day     Types: Cigarettes     Smokeless tobacco: Never   Vaping Use     Vaping status: Every Day     Substances: Nicotine        Family History:   No family history on file.     Medications:     Current Outpatient Medications   Medication Sig     ciprofloxacin (CIPRO) 500 MG tablet ciprofloxacin 500 mg tablet   TAKE 1 TABLET BY MOUTH EVERY 12 HOURS FOR 14 DAYS     naproxen (NAPROSYN) 500 MG tablet Take 500 mg by mouth     naproxen (NAPROSYN) 500 MG tablet      No current facility-administered medications for this visit.        Allergies:   No Known Allergies     Review of Systems:   As noted above     Physical Exam:   Vitals: BP  "127/79   Pulse 74   Ht 1.778 m (5' 10\")   Wt 88.5 kg (195 lb)   BMI 27.98 kg/m       Neuro:   General Appearance: No apparent distress, well-nourished, well-groomed, pleasant     Mental Status: Alert and oriented to person, place, and time. Speech fluent and comprehension intact. No dysarthria.     Cranial Nerves:   II: Visual fields: normal  III: Pupils: 3 mm, equal, round, reactive to light   III,IV,VI: Extraocular Movements: intact   V: Facial sensation: intact to light touch  VII: Facial strength: intact without asymmetry       Motor Exam:   Upper Extremities  Deltoid  Bicep  Tricep  Wrist Extensors  strength Intrinsic Muscles    Right  5  5  5  5 5 5    Left  5  5  5  5 5 5      Lower Extremities  Hip Flexors  Knee Extensors  Knee   Flexors  Dorsi Flexion  Plantar   Flexion    Right  5  5  5  5  5    Left  5  5  5  5  5    5/5 abduction and adduction of lower extremities as well    Sensory: intact to light touch, vibration, and pinprick throughout with exception of feeling PP less in region of lateral femoral cutaneous nerve bilaterally    Coordination: no dysmetria with finger-to-nose bilaterally    Reflexes: biceps, triceps, brachioradialis, patellar, and ankle jerks 2+ and symmetric. Toes are downgoing bilaterally              Again, thank you for allowing me to participate in the care of your patient.        Sincerely,        Luís Paul MD    "

## 2023-05-25 NOTE — NURSING NOTE
"John Roman's goals for this visit include:   Chief Complaint   Patient presents with     New Patient     Meralgia paraesthetica, right- ref by Kevan White MD- recs in [facility]-   scheduled per patient        He requests these members of his care team be copied on today's visit information: yes    PCP: No Ref-Primary, Physician    Referring Provider:  Kevan White MD  [address] [place] [address]    /79   Pulse 74   Ht 1.778 m (5' 10\")   Wt 88.5 kg (195 lb)   BMI 27.98 kg/m      Do you need any medication refills at today's visit? No  FLYNN Jackson., CMA ([facility])      "

## 2023-06-19 DIAGNOSIS — G57.11 MERALGIA PARAESTHETICA, RIGHT: Primary | ICD-10-CM

## 2023-06-19 RX ORDER — NORTRIPTYLINE HCL 25 MG
25 CAPSULE ORAL AT BEDTIME
Qty: 90 CAPSULE | Refills: 1 | Status: SHIPPED | OUTPATIENT
Start: 2023-06-19 | End: 2023-11-08

## 2023-09-27 ENCOUNTER — OFFICE VISIT (OUTPATIENT)
Dept: NEUROLOGY | Facility: CLINIC | Age: 33
End: 2023-09-27
Payer: COMMERCIAL

## 2023-09-27 VITALS
HEART RATE: 77 BPM | HEIGHT: 70 IN | BODY MASS INDEX: 27.92 KG/M2 | DIASTOLIC BLOOD PRESSURE: 79 MMHG | WEIGHT: 195 LBS | SYSTOLIC BLOOD PRESSURE: 130 MMHG

## 2023-09-27 DIAGNOSIS — G57.13 MERALGIA PARESTHETICA OF BOTH LOWER EXTREMITIES: Primary | ICD-10-CM

## 2023-09-27 PROCEDURE — 99214 OFFICE O/P EST MOD 30 MIN: CPT | Performed by: STUDENT IN AN ORGANIZED HEALTH CARE EDUCATION/TRAINING PROGRAM

## 2023-09-27 RX ORDER — GABAPENTIN 300 MG/1
300 CAPSULE ORAL 3 TIMES DAILY
Qty: 90 CAPSULE | Refills: 4 | Status: SHIPPED | OUTPATIENT
Start: 2023-09-27 | End: 2023-11-08

## 2023-09-27 NOTE — NURSING NOTE
"John Roman's goals for this visit include:   Chief Complaint   Patient presents with    RECHECK     Meralgia paraesthetica, right // follow up in Sept       He requests these members of his care team be copied on today's visit information: yes    PCP: No Ref-Primary, Physician    Referring Provider:  No referring provider defined for this encounter.    /79   Pulse 77   Ht 1.778 m (5' 10\")   Wt 88.5 kg (195 lb)   BMI 27.98 kg/m      Do you need any medication refills at today's visit? No  MAINOR Jackson, LOR ([facility])      "

## 2023-09-27 NOTE — PROGRESS NOTES
[facility]  Section of General Neurology  Return Patient Visit    John Roman MRN# [id number]   Age: 33 year old YOB: 1990            Assessment and Plan:   Assessment:  John Roman is a pleasant 33 year old man who presents in follow up for worsening right >left meralgia paresthetica.  Discussed options.  Lyrica made him too sleepy.  Nortriptyline some help, but limited to only at night. Does help him sleep.  I do think I would re trial injections at this point in addition to medication changes as below.      Plan:  --Continue nortriptyline 25 mg at bedtime, he does not think he could tolerate higher dosing at this time  --Re-trial gabapentin, hopefully can push high enough would be helpful in setting of combination with nortriptyline.  --Next option: Cymbalta or effexor, discussed  --Pain referral ideally to my colleague Dr. Hanks in [place] given patient location preference for consideration of bilateral lateral femoral cutaneous nerve blocks to help with refractory symptoms, future considerations could include referral for comprehensive pain evaluation.   --Follow up in ~3 months       Mazin Paul MD   of Neurology   [facility]      Interval history:     Interval hx--did not benefit from lyrica--added nortriptyline in it's stead.      Lyrica--too sleepy  Nortriptyline--helps with sleep, does not help with pain.    Present R >L now.  Worse on right.  Alters his day to day  Pain can alter his gait.        A/P at previous visit  John Roman is a pleasant 33 year old man who presents in consultation.  He has had three years of Right lateral thigh pain and now is present to an extent on the left.  It is worsening.  I agree with the diagnosis of meralgia paresthetica.  I showed him examples of this nerve and it's path and he agrees with this diagnosis as well.  I see no evidence of diffuse neuropathy or lumbar radiculopathy to  "history or exam.  Discussed treatment entails trialing medications, possible next steps re trialing nerve blocks/injections via our pain clinic potentially.  He would prefer medication management for now.   Also discussed conservative measures such as avoiding tight fitting clothing/tight belts around the waste, etc.  Lyrica: to uptitrate to 50 mg TID, can go higher from their pending toleration (previously tolerated gabapentin but did not derive benefit)  Future options discussed: cymbalta, nortriptyline      We will follow up in ~4 months.  He will reach out via [employer] to let us know how he is doing/should tinkering with medications be requisite in between visit          Past Medical History:     Patient Active Problem List   Diagnosis   (none) - all problems resolved or deleted     No past medical history on file.     Past Surgical History:   No past surgical history on file.     Social History:     Social History     Tobacco Use    Smoking status: Every Day     Types: Cigarettes    Smokeless tobacco: Never   Vaping Use    Vaping Use: Every day    Substances: Nicotine        Family History:   No family history on file.     Medications:     Current Outpatient Medications   Medication Sig    ciprofloxacin (CIPRO) 500 MG tablet ciprofloxacin 500 mg tablet   TAKE 1 TABLET BY MOUTH EVERY 12 HOURS FOR 14 DAYS    naproxen (NAPROSYN) 500 MG tablet Take 500 mg by mouth    naproxen (NAPROSYN) 500 MG tablet     nortriptyline (PAMELOR) 25 MG capsule Take 1 capsule (25 mg) by mouth At Bedtime    pregabalin (LYRICA) 50 MG capsule Take 1 capsule (50 mg) by mouth 3 times daily     No current facility-administered medications for this visit.        Allergies:   No Known Allergies       Physical Exam:   Vitals: /79   Pulse 77   Ht 1.778 m (5' 10\")   Wt 88.5 kg (195 lb)   BMI 27.98 kg/m         Neuro:   General Appearance: No apparent distress, well-nourished, well-groomed, pleasant     Mental Status: Alert and oriented " to person, place, and time. Speech fluent and comprehension intact. No dysarthria.     Cranial Nerves:   II: Visual fields: normal  III: Pupils: 3 mm, equal, round, reactive to light   III,IV,VI: Extraocular Movements: intact   V: Facial sensation: intact to light touch  VII: Facial strength: intact without asymmetry  VIII: Hearing: intact grossly       Motor Exam:   5/5 diffusely     Sensory: intact to light touch, vibration with exception of b/l lateral femoral cutaneous nerve feels less on R, hyperesthesia on L     Coordination: no dysmetria noted    Reflexes: biceps, triceps, brachioradialis, patellar, and ankle jerks 2+ and symmetric.                The total time of this encounter today amounted to 30 minutes. This time included time spent with the patient, prep work, ordering tests, and performing post visit documentation.

## 2023-09-27 NOTE — LETTER
9/27/2023         RE: John Roman  [address]  [place] [address]        Dear Colleague,    Thank you for referring your patient, John Roman, to the [facility] NEUROLOG[facility] [place]. Please see a copy of my visit note below.    [facility]  Section of General Neurology  Return Patient Visit    John Roman MRN# [id number]   Age: 33 year old YOB: 1990            Assessment and Plan:   Assessment:  John Roman is a pleasant 33 year old man who presents in follow up for worsening right >left meralgia paresthetica.  Discussed options.  Lyrica made him too sleepy.  Nortriptyline some help, but limited to only at night. Does help him sleep.  I do think I would re trial injections at this point in addition to medication changes as below.      Plan:  --Continue nortriptyline 25 mg at bedtime, he does not think he could tolerate higher dosing at this time  --Re-trial gabapentin, hopefully can push high enough would be helpful in setting of combination with nortriptyline.  --Next option: Cymbalta or effexor, discussed  --Pain referral ideally to my colleague Dr. Hanks in [place] given patient location preference for consideration of bilateral lateral femoral cutaneous nerve blocks to help with refractory symptoms, future considerations could include referral for comprehensive pain evaluation.   --Follow up in ~3 months       Mazin Paul MD   of Neurology   [facility]      Interval history:     Interval hx--did not benefit from lyrica--added nortriptyline in it's stead.      Lyrica--too sleepy  Nortriptyline--helps with sleep, does not help with pain.    Present R >L now.  Worse on right.  Alters his day to day  Pain can alter his gait.        A/P at previous visit  John Roman is a pleasant 33 year old man who presents in consultation.  He has had three years of Right lateral thigh pain and now is present to an  extent on the left.  It is worsening.  I agree with the diagnosis of meralgia paresthetica.  I showed him examples of this nerve and it's path and he agrees with this diagnosis as well.  I see no evidence of diffuse neuropathy or lumbar radiculopathy to history or exam.  Discussed treatment entails trialing medications, possible next steps re trialing nerve blocks/injections via our pain clinic potentially.  He would prefer medication management for now.   Also discussed conservative measures such as avoiding tight fitting clothing/tight belts around the waste, etc.  Lyrica: to uptitrate to 50 mg TID, can go higher from their pending toleration (previously tolerated gabapentin but did not derive benefit)  Future options discussed: cymbalta, nortriptyline      We will follow up in ~4 months.  He will reach out via [employer] to let us know how he is doing/should tinkering with medications be requisite in between visit          Past Medical History:     Patient Active Problem List   Diagnosis   (none) - all problems resolved or deleted     No past medical history on file.     Past Surgical History:   No past surgical history on file.     Social History:     Social History     Tobacco Use     Smoking status: Every Day     Types: Cigarettes     Smokeless tobacco: Never   Vaping Use     Vaping Use: Every day     Substances: Nicotine        Family History:   No family history on file.     Medications:     Current Outpatient Medications   Medication Sig     ciprofloxacin (CIPRO) 500 MG tablet ciprofloxacin 500 mg tablet   TAKE 1 TABLET BY MOUTH EVERY 12 HOURS FOR 14 DAYS     naproxen (NAPROSYN) 500 MG tablet Take 500 mg by mouth     naproxen (NAPROSYN) 500 MG tablet      nortriptyline (PAMELOR) 25 MG capsule Take 1 capsule (25 mg) by mouth At Bedtime     pregabalin (LYRICA) 50 MG capsule Take 1 capsule (50 mg) by mouth 3 times daily     No current facility-administered medications for this visit.        Allergies:   No Known  "Allergies       Physical Exam:   Vitals: /79   Pulse 77   Ht 1.778 m (5' 10\")   Wt 88.5 kg (195 lb)   BMI 27.98 kg/m         Neuro:   General Appearance: No apparent distress, well-nourished, well-groomed, pleasant     Mental Status: Alert and oriented to person, place, and time. Speech fluent and comprehension intact. No dysarthria.     Cranial Nerves:   II: Visual fields: normal  III: Pupils: 3 mm, equal, round, reactive to light   III,IV,VI: Extraocular Movements: intact   V: Facial sensation: intact to light touch  VII: Facial strength: intact without asymmetry  VIII: Hearing: intact grossly       Motor Exam:   5/5 diffusely     Sensory: intact to light touch, vibration with exception of b/l lateral femoral cutaneous nerve feels less on R, hyperesthesia on L     Coordination: no dysmetria noted    Reflexes: biceps, triceps, brachioradialis, patellar, and ankle jerks 2+ and symmetric.                The total time of this encounter today amounted to 30 minutes. This time included time spent with the patient, prep work, ordering tests, and performing post visit documentation.      Again, thank you for allowing me to participate in the care of your patient.        Sincerely,        Luís Paul MD  "

## 2023-09-27 NOTE — PATIENT INSTRUCTIONS
Start gabapentin 300 mg nightly. After about one week, take 300mg twice daily. After another week, take 300mg three times daily for a total of 900mg per day.  A common side effect of gabapentin is fatigue, which is why we advise patients to start taking the medication at nighttime. The side effect of fatigue should resolve with time.  Other possible side effects include swelling, and less likely diarrhea.  It is commonly a well tolerated medicine.      Continue nortriptyline 25 mg at night for now    Future idea: increase [facility]riptyline, or [facility]    Pain clinic for consideration of injections

## 2023-09-27 NOTE — NURSING NOTE
"John Roman's goals for this visit include:   Chief Complaint   Patient presents with    RECHECK     Meralgia paraesthetica, right // follow up in Sept       He requests these members of his care team be copied on today's visit information: yes    PCP: No Ref-Primary, Physician    Referring Provider:  No referring provider defined for this encounter.    /79   Pulse 77   Ht 1.778 m (5' 10\")   Wt 88.5 kg (195 lb)   BMI 27.98 kg/m      Do you need any medication refills at today's visit? No  MAINOR Jackson, LOR ([facility])      "

## 2023-09-29 ENCOUNTER — TELEPHONE (OUTPATIENT)
Dept: ANESTHESIOLOGY | Facility: CLINIC | Age: 33
End: 2023-09-29
Payer: COMMERCIAL

## 2023-09-29 NOTE — TELEPHONE ENCOUNTER
1. Is there imaging in the Chart? Yes (must be within 3 years)  a. Date of Imagin21  b. Type of Imaging: MR Lumbar   2. Has the patient had any previous Injections? No  a. If YES, what injection and when?   3. Did the referring provider make any recommendations (look at provider notes)? Yes  If YES, what? Procedure: Peripheral Nerve Block- My Clinical Question Is: or trial of lateral femoral cutaneous nerve blocks?      After Review please route to Pain Nurse Pool for nursing to triage.

## 2023-10-02 NOTE — TELEPHONE ENCOUNTER
Per the referring provider, 's 9/27/23 note:  -Pain referral ideally to my colleague Dr. Hanks in [place] given patient location preference for consideration of bilateral lateral femoral cutaneous nerve blocks to help with refractory symptoms, future considerations could include referral for comprehensive pain evaluation.     Routed to the [facility] nurses to review.        Edda RN-BSN  [facility]   [phone]

## 2023-10-04 ENCOUNTER — TELEPHONE (OUTPATIENT)
Dept: ANESTHESIOLOGY | Facility: CLINIC | Age: 33
End: 2023-10-04
Payer: COMMERCIAL

## 2023-10-04 DIAGNOSIS — G57.10 MERALGIA PARESTHETICA, UNSPECIFIED LATERALITY: Primary | ICD-10-CM

## 2023-10-04 NOTE — TELEPHONE ENCOUNTER
Patient is scheduled for surgery with Dr. Hanks    Spoke with: patient    Date of Surgery: 10/26/23    Location: [facility]    Informed patient they will need an adult  yes    Additional comments: Patient is aware of date and time of the procedure.        Heather Alves MA on 10/4/2023 at 2:24 PM

## 2023-10-26 ENCOUNTER — HOSPITAL ENCOUNTER (OUTPATIENT)
Facility: AMBULATORY SURGERY CENTER | Age: 33
Discharge: HOME | End: 2023-10-26
Payer: COMMERCIAL

## 2023-10-26 ENCOUNTER — ANCILLARY PROCEDURE (OUTPATIENT)
Dept: ULTRASOUND IMAGING | Facility: CLINIC | Age: 33
End: 2023-10-26
Payer: COMMERCIAL

## 2023-10-26 VITALS
DIASTOLIC BLOOD PRESSURE: 89 MMHG | RESPIRATION RATE: 16 BRPM | OXYGEN SATURATION: 99 % | SYSTOLIC BLOOD PRESSURE: 132 MMHG | TEMPERATURE: 97.7 F | HEART RATE: 65 BPM

## 2023-10-26 DIAGNOSIS — R52 PAIN: ICD-10-CM

## 2023-10-26 PROCEDURE — G8918 PT W/O PREOP ORDER IV AB PRO: HCPCS

## 2023-10-26 PROCEDURE — G8907 PT DOC NO EVENTS ON DISCHARG: HCPCS

## 2023-10-26 PROCEDURE — 64450 NJX AA&/STRD OTHER PN/BRANCH: CPT | Mod: LT

## 2023-10-26 RX ORDER — METHYLPREDNISOLONE ACETATE 40 MG/ML
INJECTION, SUSPENSION INTRA-ARTICULAR; INTRALESIONAL; INTRAMUSCULAR; SOFT TISSUE PRN
Status: DISCONTINUED | OUTPATIENT
Start: 2023-10-26 | End: 2023-10-26 | Stop reason: HOSPADM

## 2023-10-26 RX ORDER — BUPIVACAINE HYDROCHLORIDE 2.5 MG/ML
INJECTION, SOLUTION EPIDURAL; INFILTRATION; INTRACAUDAL PRN
Status: DISCONTINUED | OUTPATIENT
Start: 2023-10-26 | End: 2023-10-26 | Stop reason: HOSPADM

## 2023-10-26 RX ORDER — LIDOCAINE HYDROCHLORIDE 10 MG/ML
INJECTION, SOLUTION EPIDURAL; INFILTRATION; INTRACAUDAL; PERINEURAL PRN
Status: DISCONTINUED | OUTPATIENT
Start: 2023-10-26 | End: 2023-10-26 | Stop reason: HOSPADM

## 2023-10-26 NOTE — DISCHARGE INSTRUCTIONS
PAIN INJECTION HOME CARE INSTRUCTIONS  Activity  -Rest today  -Do not work today  -Resume normal activity tomorrow  -DO NOT shower for 24 hours  -DO NOT remove bandaid for 24 hours    Pain  -You may experience soreness at the injection site for one or two days  -You may use an ice pack for 20 minutes every 2 hours for the first 24 hours  -You may use a heating pad after the first 24 hours  -You may use Tylenol (acetaminophen) every 4 hours or other pain medicines as directed by your physician    You may experience numbness radiating into your legs or arms (depending on the procedure location). This numbness may last several hours. Until sensation returns to normal; please use caution in walking, climbing stairs, and stepping out of your vehicle, etc.    DID YOU RECEIVE SEDATION TODAY?  No    Safety  Sedation medicine, if given, may remain active for many hours. It is important for the next 24 hours that you do not:  -Drive a car  -Operate machines or power tools  -Consume alcohol, including beer  -Sign any important papers or legal documents    DID YOU RECEIVE STEROIDS TODAY?  No    Common side effects of steroids:  Not everyone will experience corticosteroid side effects. If side effects are experienced, they will gradually subside in the 7-10 day period following an injection. Most common side effects include:  -Flushed face and/or chest  -Feeling of warmth, particularly in the face but could be an overall feeling of warmth  -Increased blood sugar in diabetic patients  -Menstrual irregularities my occur. If taking hormone-based birth control an alternate method of birth control is recommended  -Sleep disturbances and/or mood swings are possible  -Leg cramps    Please contact us if you have:  -Severe pain  -Fever more than 101.5 degrees Fahrenheit  -Signs of infection at the injection site (redness, swelling, or drainage)    FOR PAIN CENTER PATIENTS:  If you have questions, please contact the Pain Clinic at  [phone] Option #1 between the hours of 7:00 am and 3:00 pm Monday through Friday. After office hours you can contact the on call provider by dialing [phone]. If you need immediate attention, we recommend that you go to a hospital emergency room or dial [phone].      FOR PM&R PATIENTS:  For patients seen by the PM and R service, please call [phone]. (Monday through Friday 8a-5p.  After business hours and weekends call [phone] and ask for the PM and R doctor on call). If you need to fax a pain diary to PM&R the fax number is [phone]. If you are unable to fax, uploading to [employer] is encouraged, then send to provider. If you have procedure scheduling questions please call [phone] Option #2.

## 2023-10-30 NOTE — OP NOTE
Lateral Femoral Cutaneous Nerve Block    The patient's identity, the procedure to be performed and the specific site of the procedure was verified in accordance with The [facility] Protocol.    Diagnosis:  Meralgia Parasthetica  Pre-procedure Pain Score: 7/10    Procedure Note: Informed consent was obtained.  The patient was positioned comfortably in the supine position. The patient was then prepped and draped in a sterile fashion.  There was no evidence of infection at the site of needle insertion.  The skin was then anesthetized with 1-% lidocaine. A 22/25 gauge spinal needle was advanced using Skeletal landmarks/        Flouroscopic Guidance.  Loss of sensation in the proper distribution was noted minutes later.   The patient tolerated the procedure well and was discharged from the clinic 30 minutes later.    Side: Bilateral  Medication:   40mg  Methylprednisolone  3ml (0.25% Bupivacaine    Post Procedure Pain Score: 6/10  Counseling: Greater than 50% of this patient visit was spent in counseling the patient regarding the treatment of their pain, coordinating their overall treatment plan and assessing their progress.    An image was saved in the record

## 2023-11-08 ENCOUNTER — OFFICE VISIT (OUTPATIENT)
Dept: NEUROLOGY | Facility: CLINIC | Age: 33
End: 2023-11-08
Payer: COMMERCIAL

## 2023-11-08 VITALS
DIASTOLIC BLOOD PRESSURE: 87 MMHG | WEIGHT: 195 LBS | SYSTOLIC BLOOD PRESSURE: 127 MMHG | BODY MASS INDEX: 27.92 KG/M2 | HEART RATE: 77 BPM | HEIGHT: 70 IN

## 2023-11-08 DIAGNOSIS — G57.11 MERALGIA PARAESTHETICA, RIGHT: ICD-10-CM

## 2023-11-08 DIAGNOSIS — M54.10 RADICULAR PAIN: Primary | ICD-10-CM

## 2023-11-08 DIAGNOSIS — G57.13 MERALGIA PARESTHETICA OF BOTH LOWER EXTREMITIES: ICD-10-CM

## 2023-11-08 PROCEDURE — 99214 OFFICE O/P EST MOD 30 MIN: CPT | Performed by: STUDENT IN AN ORGANIZED HEALTH CARE EDUCATION/TRAINING PROGRAM

## 2023-11-08 RX ORDER — GABAPENTIN 300 MG/1
600 CAPSULE ORAL 3 TIMES DAILY
Qty: 180 CAPSULE | Refills: 4 | Status: SHIPPED | OUTPATIENT
Start: 2023-11-08

## 2023-11-08 RX ORDER — NORTRIPTYLINE HYDROCHLORIDE 50 MG/1
50 CAPSULE ORAL AT BEDTIME
Qty: 90 CAPSULE | Refills: 1 | Status: SHIPPED | OUTPATIENT
Start: 2023-11-08 | End: 2024-05-08

## 2023-11-08 NOTE — LETTER
11/8/2023         RE: John Roman  [address] [place] [address]        Dear Colleague,    Thank you for referring your patient, John Roman, to the [facility] NEUROLOG[facility] [place]. Please see a copy of my visit note below.    [facility]  Section of General Neurology  Return Patient Visit    John Roman MRN# [id number]   Age: 33 year old YOB: 1990            Assessment and Plan:   Assessment:  John Roman is a pleasant 33 year old man who presents in follow up for worsening right >left meralgia paresthetica.  I still think this is likely the correct diagnosis but why he is so refractory is difficult to say.  Seeing as injections did not help I would re expand our work up given worsening radicular leg pain to re audit MRI L spine, consider obtaining EMG to exclude confounding variable/other causes.  He will think about the latter.  We will continue to uptitrate and trial medications, notably couldn't tolerate lyrica previously, SNRIs would be a next step.       Plan:  --Increase gabapentin to 600 mg TID as tolerated   --Increase nortriptyline to 50 mg at bedtime (to not do both at once)  --Pain clinic referral placed  --Follow up in 2-3 months       Mazin Paul MD   of Neurology   [facility]      Interval history:     He has since seen my colleague Dr. Hanks in our pain service at my request to trial injections  Things are worsening, really having trouble walking  Cold makes it harder to function as well he notes  Really alters his work day  Radicular pains down both legs, intensified.    Discussed previous work up, future considerations       A/P at previous visit  John Roman is a pleasant 33 year old man who presents in follow up for worsening right >left meralgia paresthetica.  Discussed options.  Lyrica made him too sleepy.  Nortriptyline some help, but limited to only at night. Does help him  sleep.  I do think I would re trial injections at this point in addition to medication changes as below.      Plan:  --Continue nortriptyline 25 mg at bedtime, he does not think he could tolerate higher dosing at this time  --Re-trial gabapentin, hopefully can push high enough would be helpful in setting of combination with nortriptyline.  --Next option: Cymbalta or effexor, discussed  --Pain referral ideally to my colleague Dr. Hanks in [place] given patient location preference for consideration of bilateral lateral femoral cutaneous nerve blocks to help with refractory symptoms, future considerations could include referral for comprehensive pain evaluation.   --Follow up in ~3 months      Past Medical History:     Patient Active Problem List   Diagnosis     Meralgia paresthetica, unspecified laterality     No past medical history on file.     Past Surgical History:     Past Surgical History:   Procedure Laterality Date     NERVE BLOCK PERIPHERAL Bilateral 10/26/2023    Procedure: Lateral Femoral Cutaneous Nerve Block;  Surgeon: Mark Hanks MD;  Location:  OR        Social History:     Social History     Tobacco Use     Smoking status: Every Day     Types: Cigarettes     Smokeless tobacco: Never   Vaping Use     Vaping Use: Every day     Substances: Nicotine        Family History:   No family history on file.     Medications:     Current Outpatient Medications   Medication Sig     ciprofloxacin (CIPRO) 500 MG tablet ciprofloxacin 500 mg tablet   TAKE 1 TABLET BY MOUTH EVERY 12 HOURS FOR 14 DAYS     gabapentin (NEURONTIN) 300 MG capsule Take 1 capsule (300 mg) by mouth 3 times daily     naproxen (NAPROSYN) 500 MG tablet Take 500 mg by mouth     naproxen (NAPROSYN) 500 MG tablet      nortriptyline (PAMELOR) 25 MG capsule Take 1 capsule (25 mg) by mouth At Bedtime     No current facility-administered medications for this visit.        Allergies:   No Known Allergies       Physical Exam:   Vitals: BP  "127/87   Pulse 77   Ht 1.778 m (5' 10\")   Wt 88.5 kg (195 lb)   BMI 27.98 kg/m       Neuro:   General Appearance: No apparent distress, well-nourished, well-groomed, pleasant     Mental Status: Alert and oriented to person, place, and time. Speech fluent and comprehension intact. No dysarthria. Normal memory, fund of knowledge, attention/concentration, and language    Cranial Nerves:   II: Visual fields: normal  III: Pupils: 3 mm, equal, round, reactive to light   III,IV,VI: Extraocular Movements: intact   V: Facial sensation: intact to light touch  VII: Facial strength: intact without asymmetry       Motor Exam:   No clear focal weakness but more profoundly limited by pain than previously      Sensory: intact to vibration, though light touch diminished in lateral femoral nerve distribution bilaterally     Coordination: no dysmetria     Reflexes: biceps, triceps, brachioradialis, patellar, and ankle jerks 2+ and symmetric.     Gait:  Very limited due to pain and resulting weakness         Data: Pertinent prior to visit   Imaging:  EXAM: MRI SPINE LUMBAR W/O CON     DATE: 4/19/2021 7:20 AM     CLINICAL DATA: Three months of low back pain with right leg radiation, right leg numbness.   ICD 10: R20.0 Anesthesia of skin     COMPARISON: None.     TECHNIQUE: Sagittal FLAIR T1, sagittal FSE T2, sagittal STIR, axial fat-saturated FSE T2, axial T1.      FINDINGS: 5 lumbar vertebrae are assumed.  Lumbar vertebrae are normal in height, alignment and marrow signal. Small Schmorl's node herniations from T11-12 to L5-S1 are present. Intervertebral disc height and hydration are maintained. The distal thoracic spinal cord and conus medullaris, terminating at the upper L1 level, are normal-appearing. Visualized upper sacroiliac joints are without significant degeneration. There is no abnormal paraspinal mass.     By intervertebral disc levels:   L5-S1:  Minor disc bulging. No ventral thecal sac deformity or central canal " stenosis. Patent central canal and neural foramina.     L4-5:  Minor disc bulging flattens the ventral thecal sac. Patent central canal and neural foramina.     L3-4:  Very mild disc bulging. Minimal narrowing of the ventral subarachnoid space. Patent central canal and neural foramina.     L2-3:  Very mild disc bulging. Minimal narrowing of the ventral subarachnoid space. Patent central canal and neural foramina.     L1-L2:  Minor disc bulging. Minimal narrowing of the ventral subarachnoid space. Patent central canal and neural foramina                The total time of this encounter today amounted to 31 minutes. This time included time spent with the patient, prep work, ordering tests, and performing post visit documentation.      Again, thank you for allowing me to participate in the care of your patient.        Sincerely,        Luís Paul MD

## 2023-11-08 NOTE — NURSING NOTE
"John Roman's goals for this visit include:   Chief Complaint   Patient presents with    RECHECK     Lateral Femoral Cutaneous Nerve Block 10/26 // meralgia paraesthetica       He requests these members of his care team be copied on today's visit information: yes    PCP: No Ref-Primary, Physician    Referring Provider:  No referring provider defined for this encounter.    /87   Pulse 77   Ht 1.778 m (5' 10\")   Wt 88.5 kg (195 lb)   BMI 27.98 kg/m      Do you need any medication refills at today's visit? No  MAINOR Jackson, CMA ([facility])      "

## 2023-11-08 NOTE — PROGRESS NOTES
[facility]/[place]  Section of General Neurology  Return Patient Visit    John Roman MRN# [id number]   Age: 33 year old YOB: 1990            Assessment and Plan:   Assessment:  John Roman is a pleasant 33 year old man who presents in follow up for worsening right >left meralgia paresthetica.  I still think this is likely the correct diagnosis but why he is so refractory is difficult to say.  Seeing as injections did not help I would re expand our work up given worsening radicular leg pain to re audit MRI L spine, consider obtaining EMG to exclude confounding variable/other causes.  He will think about the latter.  We will continue to uptitrate and trial medications, notably couldn't tolerate lyrica previously, SNRIs would be a next step.       Plan:  --Increase gabapentin to 600 mg TID as tolerated   --Increase nortriptyline to 50 mg at bedtime (to not do both at once)  --Pain clinic referral placed  --Follow up in 2-3 months       Mazin Paul MD   of Neurology   [facility]      Interval history:     He has since seen my colleague Dr. Hanks in our pain service at my request to trial injections  Things are worsening, really having trouble walking  Cold makes it harder to function as well he notes  Really alters his work day  Radicular pains down both legs, intensified.    Discussed previous work up, future considerations       A/P at previous visit  John Roman is a pleasant 33 year old man who presents in follow up for worsening right >left meralgia paresthetica.  Discussed options.  Lyrica made him too sleepy.  Nortriptyline some help, but limited to only at night. Does help him sleep.  I do think I would re trial injections at this point in addition to medication changes as below.      Plan:  --Continue nortriptyline 25 mg at bedtime, he does not think he could tolerate higher dosing at this time  --Re-trial gabapentin, hopefully  "can push high enough would be helpful in setting of combination with nortriptyline.  --Next option: Cymbalta or effexor, discussed  --Pain referral ideally to my colleague Dr. Hanks in [place] given patient location preference for consideration of bilateral lateral femoral cutaneous nerve blocks to help with refractory symptoms, future considerations could include referral for comprehensive pain evaluation.   --Follow up in ~3 months      Past Medical History:     Patient Active Problem List   Diagnosis    Meralgia paresthetica, unspecified laterality     No past medical history on file.     Past Surgical History:     Past Surgical History:   Procedure Laterality Date    NERVE BLOCK PERIPHERAL Bilateral 10/26/2023    Procedure: Lateral Femoral Cutaneous Nerve Block;  Surgeon: Mark Hanks MD;  Location:  OR        Social History:     Social History     Tobacco Use    Smoking status: Every Day     Types: Cigarettes    Smokeless tobacco: Never   Vaping Use    Vaping Use: Every day    Substances: Nicotine        Family History:   No family history on file.     Medications:     Current Outpatient Medications   Medication Sig    ciprofloxacin (CIPRO) 500 MG tablet ciprofloxacin 500 mg tablet   TAKE 1 TABLET BY MOUTH EVERY 12 HOURS FOR 14 DAYS    gabapentin (NEURONTIN) 300 MG capsule Take 1 capsule (300 mg) by mouth 3 times daily    naproxen (NAPROSYN) 500 MG tablet Take 500 mg by mouth    naproxen (NAPROSYN) 500 MG tablet     nortriptyline (PAMELOR) 25 MG capsule Take 1 capsule (25 mg) by mouth At Bedtime     No current facility-administered medications for this visit.        Allergies:   No Known Allergies       Physical Exam:   Vitals: /87   Pulse 77   Ht 1.778 m (5' 10\")   Wt 88.5 kg (195 lb)   BMI 27.98 kg/m       Neuro:   General Appearance: No apparent distress, well-nourished, well-groomed, pleasant     Mental Status: Alert and oriented to person, place, and time. Speech fluent and " comprehension intact. No dysarthria. Normal memory, fund of knowledge, attention/concentration, and language    Cranial Nerves:   II: Visual fields: normal  III: Pupils: 3 mm, equal, round, reactive to light   III,IV,VI: Extraocular Movements: intact   V: Facial sensation: intact to light touch  VII: Facial strength: intact without asymmetry       Motor Exam:   No clear focal weakness but more profoundly limited by pain than previously      Sensory: intact to vibration, though light touch diminished in lateral femoral nerve distribution bilaterally     Coordination: no dysmetria     Reflexes: biceps, triceps, brachioradialis, patellar, and ankle jerks 2+ and symmetric.     Gait:  Very limited due to pain and resulting weakness         Data: Pertinent prior to visit   Imaging:  EXAM: MRI SPINE LUMBAR W/O CON     DATE: 4/19/2021 7:20 AM     CLINICAL DATA: Three months of low back pain with right leg radiation, right leg numbness.   ICD 10: R20.0 Anesthesia of skin     COMPARISON: None.     TECHNIQUE: Sagittal FLAIR T1, sagittal FSE T2, sagittal STIR, axial fat-saturated FSE T2, axial T1.      FINDINGS: 5 lumbar vertebrae are assumed.  Lumbar vertebrae are normal in height, alignment and marrow signal. Small Schmorl's node herniations from T11-12 to L5-S1 are present. Intervertebral disc height and hydration are maintained. The distal thoracic spinal cord and conus medullaris, terminating at the upper L1 level, are normal-appearing. Visualized upper sacroiliac joints are without significant degeneration. There is no abnormal paraspinal mass.     By intervertebral disc levels:   L5-S1:  Minor disc bulging. No ventral thecal sac deformity or central canal stenosis. Patent central canal and neural foramina.     L4-5:  Minor disc bulging flattens the ventral thecal sac. Patent central canal and neural foramina.     L3-4:  Very mild disc bulging. Minimal narrowing of the ventral subarachnoid space. Patent central canal and  neural foramina.     L2-3:  Very mild disc bulging. Minimal narrowing of the ventral subarachnoid space. Patent central canal and neural foramina.     L1-L2:  Minor disc bulging. Minimal narrowing of the ventral subarachnoid space. Patent central canal and neural foramina                The total time of this encounter today amounted to 31 minutes. This time included time spent with the patient, prep work, ordering tests, and performing post visit documentation.

## 2023-11-08 NOTE — PATIENT INSTRUCTIONS
EMG test  (electroymyography)--shock your nerves/listen to your muscles    I still think this is bad meralgia paresthetica     I would go 50 mg nortriptiline at night    Go up every few days to 2 tablets gabpentin 3x a day    For both of these we can go higher (3 tabs 3x a day for gabapentin, can go to 75 mg at night of nortriptyline)    Pain clinic referral

## 2023-11-24 ENCOUNTER — ANCILLARY PROCEDURE (OUTPATIENT)
Dept: MRI IMAGING | Facility: CLINIC | Age: 33
End: 2023-11-24
Attending: STUDENT IN AN ORGANIZED HEALTH CARE EDUCATION/TRAINING PROGRAM
Payer: COMMERCIAL

## 2023-11-24 DIAGNOSIS — M54.10 RADICULAR PAIN: ICD-10-CM

## 2023-11-24 PROCEDURE — 72148 MRI LUMBAR SPINE W/O DYE: CPT | Mod: GC | Performed by: RADIOLOGY

## 2024-01-17 ENCOUNTER — OFFICE VISIT (OUTPATIENT)
Dept: NEUROLOGY | Facility: CLINIC | Age: 34
End: 2024-01-17
Payer: COMMERCIAL

## 2024-01-17 VITALS
SYSTOLIC BLOOD PRESSURE: 117 MMHG | BODY MASS INDEX: 27.55 KG/M2 | HEART RATE: 76 BPM | DIASTOLIC BLOOD PRESSURE: 78 MMHG | WEIGHT: 192 LBS

## 2024-01-17 DIAGNOSIS — M79.2 NERVE PAIN: ICD-10-CM

## 2024-01-17 DIAGNOSIS — G57.13 MERALGIA PARESTHETICA OF BOTH LOWER EXTREMITIES: ICD-10-CM

## 2024-01-17 DIAGNOSIS — M54.10 RADICULAR PAIN: Primary | ICD-10-CM

## 2024-01-17 PROCEDURE — 99215 OFFICE O/P EST HI 40 MIN: CPT | Performed by: STUDENT IN AN ORGANIZED HEALTH CARE EDUCATION/TRAINING PROGRAM

## 2024-01-17 RX ORDER — METHYLPREDNISOLONE 4 MG
TABLET, DOSE PACK ORAL
Qty: 21 TABLET | Refills: 0 | Status: SHIPPED | OUTPATIENT
Start: 2024-01-17 | End: 2024-02-07

## 2024-01-17 RX ORDER — VENLAFAXINE HYDROCHLORIDE 37.5 MG/1
37.5 CAPSULE, EXTENDED RELEASE ORAL DAILY
Qty: 90 CAPSULE | Refills: 1 | Status: SHIPPED | OUTPATIENT
Start: 2024-01-17 | End: 2024-07-17

## 2024-01-17 NOTE — PATIENT INSTRUCTIONS
Cream options:  Voltaren   Lidocaine  Capsicin   One at a time, see what you like and don't like--apply with gloves    EMG--to see if we are missing anything  Medrol dose pack  Effexor --discussed overlap/possible S.E.with nortriptyline (serotonergic) , willing to try.   Pain referral     Easiest move--push the gabapentin even higher

## 2024-01-17 NOTE — LETTER
1/17/2024         RE: John Roman  [address]  [place] [address]        Dear Colleague,    Thank you for referring your patient, John Roman, to the [facility] [place]. Please see a copy of my visit note below.    [facility]  Section of General Neurology  Return Patient Visit    John Roman MRN# [id number]   Age: 33 year old YOB: 1990          Assessment and Plan:   Assessment:  John Roman is a pleasant 33 year old man who presents in follow up for probable refractory right >left meralgia paresthetica.  This has been a long standing but worsening problem.  We have tried many things to limited avail.  He does think the gabapentin and nortriptyline help to an extent but incompletely so.  He is still limited and struggles positionally (car rides especially can hurt in R leg).  Given how refractory he is we further explored other confounding variables and repeated MRI L spine (unrevealing).  Will next obtain EMG to ensure we aren't missing anything.  Pain clinic referral again placed, he notes he didn't receive the phone calls previously.    I have many patients on an SNRI and a TCA concurrently but we discussed it is ideal to avoid these in combination given risk of serotonergic excess and symptoms/side effects therein.  To risk/benefit discussion he would like to try at low doses and see how he does. With any inkling of unacceptable side effects would simply stop the effexor.  Next move would be going higher on gabapentin to 900 mg TID.  He previously did not tolerate lyrica.       Plan:  Topical options discussed: Voltaren  Lidocaine Capsicin  To trial one at a time, see what he likes PRN.   EMG b/l LE-to see if we are missing anything  Medrol dose pack--to see if can improve any occult inflammation  Effexor --37.5 mg, side effects discussed as above  Continue gabapentin 600 mg TID, nortriptyline 50 mg at bedtime   Pain referral placed to  see if other ideas or options, ? Re trial injection (didn't help previously)  Follow up in 6 months sooner with any issues questions or changes       Mazin Paul MD   of Neurology   [facility]      Interval history:     Updated MRI L spine unrevealing   Did not schedule with pain clinic  Lyrica made him too sleepy previously of note  Still have not tried effexor or cymbalta     Cold makes it worse.    Gabapentin 600 mg TID  Nortriptyline 50 mg   Feels weak and tired in that leg.   Sitting in car too long makes R leg weak  Nortriptyline 50 does help him sleep.      Pain can be 10/10, right now 6/10.    Driving 8/10.      A/P at last visit  John Roman is a pleasant 33 year old man who presents in follow up for worsening right >left meralgia paresthetica.  I still think this is likely the correct diagnosis but why he is so refractory is difficult to say.  Seeing as injections did not help I would re expand our work up given worsening radicular leg pain to re audit MRI L spine, consider obtaining EMG to exclude confounding variable/other causes.  He will think about the latter.  We will continue to uptitrate and trial medications, notably couldn't tolerate lyrica previously, SNRIs would be a next step.       Plan:  --Increase gabapentin to 600 mg TID as tolerated   --Increase nortriptyline to 50 mg at bedtime (to not do both at once)  --Pain clinic referral placed  --Follow up in 2-3 months      Past Medical History:     Patient Active Problem List   Diagnosis     Meralgia paresthetica, unspecified laterality     No past medical history on file.     Past Surgical History:     Past Surgical History:   Procedure Laterality Date     NERVE BLOCK PERIPHERAL Bilateral 10/26/2023    Procedure: Lateral Femoral Cutaneous Nerve Block;  Surgeon: Mark Hanks MD;  Location:  OR        Social History:     Social History     Tobacco Use     Smoking status: Every Day     Types:  Cigarettes     Smokeless tobacco: Never   Vaping Use     Vaping Use: Every day     Substances: Nicotine        Family History:   No family history on file.     Medications:     Current Outpatient Medications   Medication Sig     ciprofloxacin (CIPRO) 500 MG tablet ciprofloxacin 500 mg tablet   TAKE 1 TABLET BY MOUTH EVERY 12 HOURS FOR 14 DAYS     gabapentin (NEURONTIN) 300 MG capsule Take 2 capsules (600 mg) by mouth 3 times daily     naproxen (NAPROSYN) 500 MG tablet Take 500 mg by mouth     naproxen (NAPROSYN) 500 MG tablet      nortriptyline (PAMELOR) 50 MG capsule Take 1 capsule (50 mg) by mouth at bedtime     No current facility-administered medications for this visit.        Allergies:   No Known Allergies       Physical Exam:   Vitals: /78   Pulse 76   Wt 87.1 kg (192 lb)   BMI 27.55 kg/m       Neuro:   General Appearance: No apparent distress, well-nourished, well-groomed, pleasant      Mental Status: Alert and oriented to person, place, and time. Speech fluent and comprehension intact. No dysarthria.     Cranial Nerves:   II: Visual fields: normal  III: Pupils: 3 mm, equal, round, reactive to light   III,IV,VI: Extraocular Movements: intact   V: Facial sensation: intact to light touch  VII: Facial strength: intact without asymmetry        Motor Exam:   No clear focal weakness but remains limited by pain to an extent primarily with R hip flexion       Sensory: vibration felt as slightly diminished in RLE compared to L, PP/LT diminished only on lateral aspect of thigh b/l remaining consistent with femoral cutaneous nerve distribution      Coordination: no dysmetria      Reflexes: biceps, triceps, brachioradialis, patellar, and ankle jerks 2+ and symmetric.             Data: Pertinent prior to visit   Imaging:  MR LUMBAR SPINE W/O CONTRAST 11/24/2023 7:16 AM     Provided History: exclude radiculopathy, symptoms felt to be meralgia  paresthetica but refractory, to exclude secondary cause;  Radicular  pain     ICD-10: Radicular pain     Comparison: Outside lumbar spine MRI 4/19/2021     Technique: Sagittal T1-weighted, sagittal STIR, sagittal  diffusion-weighted (with ADC map), axial T1-weighted, and 3D  volumetric axial and sagittal reconstructed T2-weighted images of the  lumbar spine were obtained without intravenous contrast.      Findings: There are 5 lumbar-type vertebrae assumed for the purposes  of this dictation.  The tip of the conus medullaris is at L1.  Normal  lumbar vertebral alignment.  There is no significant disc height  narrowing. Scattered Schmorl nodes. Borderline congenital narrowing of  spinal canal where it measures 11 mm in smallest diameter.  Normal  marrow signal.     On a level by level basis:     T12-L1: No spinal canal or neuroforaminal stenosis.     L1-2: No spinal canal or neuroforaminal stenosis.     L2-3: No spinal canal or neuroforaminal stenosis.     L3-4: Mild disc bulge. Bilateral mild neural foraminal narrowing. No  spinal canal stenosis.     L4-5: Left eccentric disc bulge. Mild left greater than right neural  foraminal narrowing. No spinal canal or neuroforaminal stenosis.     L5-S1: Small left central disc protrusion. No spinal canal or  neuroforaminal stenosis.     Paraspinous tissues are within normal limits.                                                                      Impression:  Relatively mild lumbar spondylosis not significantly  progressed from prior MRI. No high grade neural foraminal or spinal  stenosis. Borderline congenital narrowing of spinal canal.        I personally reviewed the above images and reports.  The imaging represents to me unrevealing spine MRI               The total time of this encounter today amounted to 40 minutes. This time included time spent with the patient, prep work, ordering tests, and performing post visit documentation.        Again, thank you for allowing me to participate in the care of your patient.         Sincerely,        Luís Paul MD

## 2024-01-17 NOTE — PROGRESS NOTES
[facility]/[place]  Section of General Neurology  Return Patient Visit    John Roman MRN# [id number]   Age: 33 year old YOB: 1990          Assessment and Plan:   Assessment:  John Roman is a pleasant 33 year old man who presents in follow up for probable refractory right >left meralgia paresthetica.  This has been a long standing but worsening problem.  We have tried many things to limited avail.  He does think the gabapentin and nortriptyline help to an extent but incompletely so.  He is still limited and struggles positionally (car rides especially can hurt in R leg).  Given how refractory he is we further explored other confounding variables and repeated MRI L spine (unrevealing).  Will next obtain EMG to ensure we aren't missing anything.  Pain clinic referral again placed, he notes he didn't receive the phone calls previously.    I have many patients on an SNRI and a TCA concurrently but we discussed it is ideal to avoid these in combination given risk of serotonergic excess and symptoms/side effects therein.  To risk/benefit discussion he would like to try at low doses and see how he does. With any inkling of unacceptable side effects would simply stop the effexor.  Next move would be going higher on gabapentin to 900 mg TID.  He previously did not tolerate lyrica.       Plan:  Topical options discussed: Voltaren  Lidocaine Capsicin  To trial one at a time, see what he likes PRN.   EMG b/l LE-to see if we are missing anything  Medrol dose pack--to see if can improve any occult inflammation  Effexor --37.5 mg, side effects discussed as above  Continue gabapentin 600 mg TID, nortriptyline 50 mg at bedtime   Pain referral placed to see if other ideas or options, ? Re trial injection (didn't help previously)  Follow up in 6 months sooner with any issues questions or changes       Mazin Paul MD   of Neurology   [facility]       Interval history:     Updated MRI L spine unrevealing   Did not schedule with pain clinic  Lyrica made him too sleepy previously of note  Still have not tried effexor or cymbalta     Cold makes it worse.    Gabapentin 600 mg TID  Nortriptyline 50 mg   Feels weak and tired in that leg.   Sitting in car too long makes R leg weak  Nortriptyline 50 does help him sleep.      Pain can be 10/10, right now 6/10.    Driving 8/10.      A/P at last visit  John Roman is a pleasant 33 year old man who presents in follow up for worsening right >left meralgia paresthetica.  I still think this is likely the correct diagnosis but why he is so refractory is difficult to say.  Seeing as injections did not help I would re expand our work up given worsening radicular leg pain to re audit MRI L spine, consider obtaining EMG to exclude confounding variable/other causes.  He will think about the latter.  We will continue to uptitrate and trial medications, notably couldn't tolerate lyrica previously, SNRIs would be a next step.       Plan:  --Increase gabapentin to 600 mg TID as tolerated   --Increase nortriptyline to 50 mg at bedtime (to not do both at once)  --Pain clinic referral placed  --Follow up in 2-3 months      Past Medical History:     Patient Active Problem List   Diagnosis    Meralgia paresthetica, unspecified laterality     No past medical history on file.     Past Surgical History:     Past Surgical History:   Procedure Laterality Date    NERVE BLOCK PERIPHERAL Bilateral 10/26/2023    Procedure: Lateral Femoral Cutaneous Nerve Block;  Surgeon: Mark Hanks MD;  Location:  OR        Social History:     Social History     Tobacco Use    Smoking status: Every Day     Types: Cigarettes    Smokeless tobacco: Never   Vaping Use    Vaping Use: Every day    Substances: Nicotine        Family History:   No family history on file.     Medications:     Current Outpatient Medications   Medication Sig    ciprofloxacin (CIPRO)  500 MG tablet ciprofloxacin 500 mg tablet   TAKE 1 TABLET BY MOUTH EVERY 12 HOURS FOR 14 DAYS    gabapentin (NEURONTIN) 300 MG capsule Take 2 capsules (600 mg) by mouth 3 times daily    naproxen (NAPROSYN) 500 MG tablet Take 500 mg by mouth    naproxen (NAPROSYN) 500 MG tablet     nortriptyline (PAMELOR) 50 MG capsule Take 1 capsule (50 mg) by mouth at bedtime     No current facility-administered medications for this visit.        Allergies:   No Known Allergies       Physical Exam:   Vitals: /78   Pulse 76   Wt 87.1 kg (192 lb)   BMI 27.55 kg/m       Neuro:   General Appearance: No apparent distress, well-nourished, well-groomed, pleasant      Mental Status: Alert and oriented to person, place, and time. Speech fluent and comprehension intact. No dysarthria.     Cranial Nerves:   II: Visual fields: normal  III: Pupils: 3 mm, equal, round, reactive to light   III,IV,VI: Extraocular Movements: intact   V: Facial sensation: intact to light touch  VII: Facial strength: intact without asymmetry        Motor Exam:   No clear focal weakness but remains limited by pain to an extent primarily with R hip flexion       Sensory: vibration felt as slightly diminished in RLE compared to L, PP/LT diminished only on lateral aspect of thigh b/l remaining consistent with femoral cutaneous nerve distribution      Coordination: no dysmetria      Reflexes: biceps, triceps, brachioradialis, patellar, and ankle jerks 2+ and symmetric.             Data: Pertinent prior to visit   Imaging:  MR LUMBAR SPINE W/O CONTRAST 11/24/2023 7:16 AM     Provided History: exclude radiculopathy, symptoms felt to be meralgia  paresthetica but refractory, to exclude secondary cause; Radicular  pain     ICD-10: Radicular pain     Comparison: Outside lumbar spine MRI 4/19/2021     Technique: Sagittal T1-weighted, sagittal STIR, sagittal  diffusion-weighted (with ADC map), axial T1-weighted, and 3D  volumetric axial and sagittal reconstructed  T2-weighted images of the  lumbar spine were obtained without intravenous contrast.      Findings: There are 5 lumbar-type vertebrae assumed for the purposes  of this dictation.  The tip of the conus medullaris is at L1.  Normal  lumbar vertebral alignment.  There is no significant disc height  narrowing. Scattered Schmorl nodes. Borderline congenital narrowing of  spinal canal where it measures 11 mm in smallest diameter.  Normal  marrow signal.     On a level by level basis:     T12-L1: No spinal canal or neuroforaminal stenosis.     L1-2: No spinal canal or neuroforaminal stenosis.     L2-3: No spinal canal or neuroforaminal stenosis.     L3-4: Mild disc bulge. Bilateral mild neural foraminal narrowing. No  spinal canal stenosis.     L4-5: Left eccentric disc bulge. Mild left greater than right neural  foraminal narrowing. No spinal canal or neuroforaminal stenosis.     L5-S1: Small left central disc protrusion. No spinal canal or  neuroforaminal stenosis.     Paraspinous tissues are within normal limits.                                                                      Impression:  Relatively mild lumbar spondylosis not significantly  progressed from prior MRI. No high grade neural foraminal or spinal  stenosis. Borderline congenital narrowing of spinal canal.        I personally reviewed the above images and reports.  The imaging represents to me unrevealing spine MRI               The total time of this encounter today amounted to 40 minutes. This time included time spent with the patient, prep work, ordering tests, and performing post visit documentation.

## 2024-01-17 NOTE — NURSING NOTE
Chief Complaint   Patient presents with    RECHECK     Per patient condition is worse with the cold and with walking     Alanna Cruz CMA at 9:23 AM on 1/17/2024.

## 2024-01-29 ENCOUNTER — MYC MEDICAL ADVICE (OUTPATIENT)
Dept: NEUROLOGY | Facility: CLINIC | Age: 34
End: 2024-01-29
Payer: COMMERCIAL

## 2024-01-29 NOTE — TELEPHONE ENCOUNTER
RN called the pt to discuss the concerns he mentioned in his mychart message. He has noticed a progressive change in his pain along with some increased intermittent weakness. He denies any changes in his bowel or bladder function.     Standing and walking make it worse and sitting down wrapping his legs/hips up in a hot blanket helps. He did try one of the topical over the counter gels but did not get any relief.     He is scheduled for his pain clinic consult on 2/7/24 and his EMG is on 3/19/24.     Encounter routed to the provider to review and advise.    Ashley Mcnair RN Care Coordinator   Neurology/Neurosurgery/PM&R/ Pain Management

## 2024-02-06 NOTE — PROGRESS NOTES
"  [facility] Consultation    Date of visit: 2/7/2024    Reason for consultation:    John Roman is a 33 year old male who is seen in consultation today at the request of his provider, Dr. Luís Paul of neurology  re: patient's meralgia paresthetica of both lower extremities, nerve pain.      Primary Care Provider is No Ref-Primary, Physician.  Pain medications are being prescribed by none.        Chief Complaint:  bilateral lateral hip and thigh pain to the knees. Can extend to the feet at times. Started 3 years ago.   Chief Complaint   Patient presents with    Pain       Pain history:  John Roman is a 33 year old male who first started having problems with pain as follows:     Pain history collected at initial consult on 2/7/2024  -he has had this pain for about 3 years. He doesn't remember how it began. The right side began first and then extended into the left leg. He denies any pain in to the low back. Pain is present bilateral lateral hips into the anterolateral thighs and extends to the knees. He notes that at times the pain can extend to his feet. Last week that happened and \"I couldn't even move.\" He wants to know if he will have to deal with this his whole life. He has had injections in the past that were not effective, pain was worse. He could not stand or walk. Unclear if this was GT injections or lateral femoral cutaneous nerve blocks.   He feels his legs are weak when he stands up. He has painful numbness and like a sharp knife in his legs. Again extends always to the knees and sometimes extends to his feet. He says he can experience some foot drop at times. He states he has some saddle anesthesia. No b/b incontinence but does have some urgency. No issues with having an erection.       Pain rating: intensity ranges from 6/10 to 10/10, and Averages 6/10 on a 0-10 scale.  Pain right now is 6/10.   Describes pain as \"painful numbness, knife-like pain.\"  Pain is " constant.      Home self care includes: wears long underwear to stay warm, warm showers and warm bath    Aggravating factors include: driving a car painful with legs partially extended. Cold makes pain worse.    Relieving factors include: warming the area. Knee supports    Any bowel or bladder incontinence: none      Current pain-related medication treatments include:  -gabapentin 600mg TID (not helpful, tired)  -medrol dose-christina 4mg tab therapy pack (not helpful done in January 2024)  -naproxen 500mg PRN (uses this as needed, not every day. Sometimes this is helpful, this is better than using ibuprofen)  -nortriptyline 50mg at HS (helpful for sleep)  -venlafaxine XR 37.5mg every day (helpful, just began in January 2024)    Other pertinent medications:  -none    Previous medication treatments included:    Opioids: has not tried   NSAIDs: Naproxen (can be helpful), ibuprofen (naproxen is more helpful)   Muscle relaxants: has not tried   Neuroleptics: Gabapentin (not helpful, feels tired on 600mg TID)   Pain Antidepressants/ Anxiolytics: Nortriptyline (helpful for sleep), venlafaxine (helpful, just started this in January 2024)   Sleep Aids: Nortriptyline (helpful for sleep)   Migraine Medications: has not tried    Topical: has tried OTC creams (not helpful)    Adjuvant medications: oral steroids (not helpful), Tylenol (slightly helpful)        Medications and Allergies reviewed. Yes     Other treatments have included:  John Roman has not been seen at a pain clinic in the past.    Pain Clinic:   No    Physical therapy: No    Psychologist: No   Chiropractor: No   Acupuncture: No   Pharmacotherapy:    Opioids: No     Non-opioids:  Yes  TENs Unit/electric stim: No   Heat/Cold: Yes heat is helpful. Cold makes pain markedly worse      Injections:   -10/26/2023 bilateral lateral femoral cutaneous nerve blocks by Dr. Mark Hanks (no benefit, felt worse for several months)    Pertinent Surgeries:  none    Past Medical  History:  No past medical history on file.  Past Surgical History:  Past Surgical History:   Procedure Laterality Date    NERVE BLOCK PERIPHERAL Bilateral 10/26/2023    Procedure: Lateral Femoral Cutaneous Nerve Block;  Surgeon: Mark Hanks MD;  Location: [facility] OR     Medications:  Current Outpatient Medications   Medication Sig Dispense Refill    benzoyl peroxide 5 % external liquid Apply topically at bedtime      ciprofloxacin (CIPRO) 500 MG tablet       esomeprazole (NEXIUM) 40 MG DR capsule Take 40 mg by mouth every 24 hours      gabapentin (NEURONTIN) 300 MG capsule Take 2 capsules (600 mg) by mouth 3 times daily 180 capsule 4    naproxen (NAPROSYN) 500 MG tablet Take 500 mg by mouth      nortriptyline (PAMELOR) 50 MG capsule Take 1 capsule (50 mg) by mouth at bedtime 90 capsule 1    venlafaxine (EFFEXOR XR) 37.5 MG 24 hr capsule Take 1 capsule (37.5 mg) by mouth daily 90 capsule 1    methylPREDNISolone (MEDROL DOSEPAK) 4 MG tablet therapy pack Follow Package Directions (Patient not taking: Reported on 2/7/2024) 21 tablet 0     Allergies:   No Known Allergies  Social History:  Home situation: , lives with wife. 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   Alcohol use: never  Drug use: none  History of chemical dependency treatment: none    Family history:  No family history on file.      Review of Systems:  The 14 system ROS was reviewed with the patient and is positive for: positives are in bold  Constitutional: fever/chills, fatigue, weight gain, weight loss  Eyes/Head: headache, dizziness  ENT: ringing in ears  Allergy/Immune: allergies  Skin: itching, rash, hives  Hematologic: easy bruising  Respiratory: cough, wheezing, shortness of breath  Cardiovascular: swelling in feet, fainting, palpitations, chest pain  GI: abdominal pain, nausea, vomiting, diarrhea, constipation  Endocrine:  steroid use  Musculoskeletal:  joint pain, arthritis, stiffness, gout, back pain, neck pain  Urinary: frequency, urgency, incontinence, hesitancy  Neurologic: weakness, numbness/tingling (bilateral upper thighs), seizure, stroke, memory loss  Mental health: depression, anxiety, stress, suicidal ideation      Physical Exam:  Vitals:    02/07/24 0850   BP: 118/76   Pulse: 80     Exam:  Constitutional: healthy, alert, and no distress  Head: normocephalic. Atraumatic.   Eyes: no redness or jaundice noted   ENT: oropharnx normal.  MMM.   Cardiovascular: RRR no m/g/r   Respiratory: clear to auscultation A/P. Respirations easy and unlabored. Able to speak in full sentences without SOB or cough noted.    Gastrointestinal: soft, non-tender   : deferred  Skin: no suspicious lesions or rashes  Psychiatric: mentation appears normal and affect normal/bright    Musculoskeletal exam:  Gait/Station/Posture: fair posture. Normal gait. Able to rise onto toes and heels. Able to perform tandem gait.     Cervical spine:    Flex:  20 degrees   Ext: 20 degrees   Rotation to right: 80 degrees   Rotation to left: 80 degrees   Ext/rotation to the right pain free   Ext/rotation to the left pain free    Thoracic spine:  Normal     Lumbar spine:    Flex:  90 degrees   Ext: 20 degrees, painful   Rotation/ext to right: painful    Rotation/ext to left: painful    SI joints: Non-tender bilaterally    Piriformis: Non-tender bilaterally    Greater trochanters: Non-tender bilaterally     Myofascial tenderness:  none  Straight leg exam: negative  Ryan's maneuver: negative     Neurologic exam:  CN:  Cranial nerves 2-12 are  Grossly normal  Motor:  5/5 UE and LE strength  Reflexes:     Biceps:     R:  2/4 L: 2/4   Brachioradialis   R:  2/4 L: 2/4      Patella:  R:  2/4 L: 2/4   Achilles:  R:  2/4 L: 2/4  Other reflexes:     Emmanuel's negative  Sensory:  (upper and lower extremities):   Light touch: normal    Vibration: normal    Pin prick: normal  "   Allodynia: absent    Dysethesia: absent    Hyperalgesia: absent     IMAGING:  MR LUMBAR SPINE W/O CONTRAST 11/24/2023 7:16 AM     Provided History: exclude radiculopathy, symptoms felt to be meralgia  paresthetica but refractory, to exclude secondary cause; Radicular  pain     ICD-10: Radicular pain     Comparison: Outside lumbar spine MRI 4/19/2021     Technique: Sagittal T1-weighted, sagittal STIR, sagittal  diffusion-weighted (with ADC map), axial T1-weighted, and 3D  volumetric axial and sagittal reconstructed T2-weighted images of the  lumbar spine were obtained without intravenous contrast.      Findings: There are 5 lumbar-type vertebrae assumed for the purposes  of this dictation.  The tip of the conus medullaris is at L1.  Normal  lumbar vertebral alignment.  There is no significant disc height  narrowing. Scattered Schmorl nodes. Borderline congenital narrowing of  spinal canal where it measures 11 mm in smallest diameter.  Normal  marrow signal.     On a level by level basis:     T12-L1: No spinal canal or neuroforaminal stenosis.     L1-2: No spinal canal or neuroforaminal stenosis.     L2-3: No spinal canal or neuroforaminal stenosis.     L3-4: Mild disc bulge. Bilateral mild neural foraminal narrowing. No  spinal canal stenosis.     L4-5: Left eccentric disc bulge. Mild left greater than right neural  foraminal narrowing. No spinal canal or neuroforaminal stenosis.     L5-S1: Small left central disc protrusion. No spinal canal or  neuroforaminal stenosis.     Paraspinous tissues are within normal limits.                                                                      Impression:  Relatively mild lumbar spondylosis not significantly  progressed from prior MRI. No high grade neural foraminal or spinal  stenosis. Borderline congenital narrowing of spinal canal.         LABS:  No results found for: \"CR\", \"GFRESTIMATED\", \"ALKPHOS\", \"AST\", \"ALT\"       Screening tools:     DIRE Score for ongoing " opioid management is calculated as follows:    Diagnosis = 2    Intractability = 2    Risk: Psych = 2  Chem Hlth = 3  Reliability = 2  Social = 3    Efficacy = 2    Total DIRE Score = 16 (14 or higher predicts good candidate for ongoing opioid management; 13 or lower predicts poor candidate for opioid management)         [place]  [place]  February 7, 2024- no concerning fills. Only gabapentin or pregabalin  Melani SCHNEIDER, RN CNP, FNP  [facility]  [place] location      Assessment:  Lumbar facet joint pain  Lumbosacral spondylosis without myelopathy  Lumbar DDD  Lumbar radicular pain  PMHx includes: none listed  PSHx includes: bilateral lateral femoral cutaneous nerve block (10/2023)        Plan:  Diagnosis reviewed, treatment option addressed, and risk/benefits discussed.  Self-care instructions given.  I am recommending a multidisciplinary treatment plan to help this patient better manage his pain.      Physical Therapy: ordered physical therapy you need to do at least 4 visits  Clinical Health Psychologist to address issues of relaxation, behavioral change, coping style, and other factors important to improvement: none  Diagnostic Studies: none. I agree with upcoming EMG testing  Medication Management:   No medication changes at this time  Further procedures recommended: schedule lumbar medial branch blocks (test injectins) and proceed to lumbar radiofrequency ablation (burning procedure) as indicated. Our office will contact you. Handouts given today about these procedures  Recommendations/follow-up for PCP:  see above  Release of information: none  Follow up: 12 weeks in-person.         ASSESSMENT AND PLAN:  (M54.59) Lumbar facet joint pain  (primary encounter diagnosis)  Comment:   Plan: PAIN PT EVAL AND TREAT, PAIN INJECTION         EVAL/TREAT/FOLLOW UP            (M47.557) Lumbosacral spondylosis without myelopathy  Comment:   Plan: PAIN PT EVAL AND TREAT, PAIN  INJECTION         EVAL/TREAT/FOLLOW UP            (M51.36) DDD (degenerative disc disease), lumbar  Comment:   Plan: PAIN PT EVAL AND TREAT, PAIN INJECTION         EVAL/TREAT/FOLLOW UP            (M54.16) Lumbar radicular pain  Comment:  Plan: PAIN PT EVAL AND TREAT            Face to face time: 69 minutes       Melani SCHNEIDER, RN CNP, FNP  [facility]  [facility]      appropriate

## 2024-02-07 ENCOUNTER — OFFICE VISIT (OUTPATIENT)
Dept: PALLIATIVE MEDICINE | Facility: CLINIC | Age: 34
End: 2024-02-07
Attending: STUDENT IN AN ORGANIZED HEALTH CARE EDUCATION/TRAINING PROGRAM
Payer: COMMERCIAL

## 2024-02-07 VITALS — SYSTOLIC BLOOD PRESSURE: 118 MMHG | HEART RATE: 80 BPM | DIASTOLIC BLOOD PRESSURE: 76 MMHG

## 2024-02-07 DIAGNOSIS — M54.59 LUMBAR FACET JOINT PAIN: Primary | ICD-10-CM

## 2024-02-07 DIAGNOSIS — M47.817 LUMBOSACRAL SPONDYLOSIS WITHOUT MYELOPATHY: ICD-10-CM

## 2024-02-07 DIAGNOSIS — M51.369 DDD (DEGENERATIVE DISC DISEASE), LUMBAR: ICD-10-CM

## 2024-02-07 DIAGNOSIS — M54.16 LUMBAR RADICULAR PAIN: ICD-10-CM

## 2024-02-07 PROCEDURE — 99205 OFFICE O/P NEW HI 60 MIN: CPT | Performed by: NURSE PRACTITIONER

## 2024-02-07 RX ORDER — ESOMEPRAZOLE MAGNESIUM 40 MG/1
40 CAPSULE, DELAYED RELEASE ORAL EVERY 24 HOURS
COMMUNITY
Start: 2023-12-18

## 2024-02-07 ASSESSMENT — ANXIETY QUESTIONNAIRES
7. FEELING AFRAID AS IF SOMETHING AWFUL MIGHT HAPPEN: NOT AT ALL
GAD7 TOTAL SCORE: 5
IF YOU CHECKED OFF ANY PROBLEMS ON THIS QUESTIONNAIRE, HOW DIFFICULT HAVE THESE PROBLEMS MADE IT FOR YOU TO DO YOUR WORK, TAKE CARE OF THINGS AT HOME, OR GET ALONG WITH OTHER PEOPLE: SOMEWHAT DIFFICULT
3. WORRYING TOO MUCH ABOUT DIFFERENT THINGS: MORE THAN HALF THE DAYS
1. FEELING NERVOUS, ANXIOUS, OR ON EDGE: NOT AT ALL
7. FEELING AFRAID AS IF SOMETHING AWFUL MIGHT HAPPEN: NOT AT ALL
5. BEING SO RESTLESS THAT IT IS HARD TO SIT STILL: SEVERAL DAYS
GAD7 TOTAL SCORE: 5
4. TROUBLE RELAXING: SEVERAL DAYS
8. IF YOU CHECKED OFF ANY PROBLEMS, HOW DIFFICULT HAVE THESE MADE IT FOR YOU TO DO YOUR WORK, TAKE CARE OF THINGS AT HOME, OR GET ALONG WITH OTHER PEOPLE?: SOMEWHAT DIFFICULT
2. NOT BEING ABLE TO STOP OR CONTROL WORRYING: SEVERAL DAYS
6. BECOMING EASILY ANNOYED OR IRRITABLE: NOT AT ALL

## 2024-02-07 ASSESSMENT — PAIN SCALES - GENERAL: PAINLEVEL: SEVERE PAIN (6)

## 2024-02-07 ASSESSMENT — PAIN SCALES - PAIN ENJOYMENT GENERAL ACTIVITY SCALE (PEG): PEG_TOTALSCORE: INCOMPLETE

## 2024-02-07 NOTE — PATIENT INSTRUCTIONS
Physical Therapy: ordered physical therapy you need to do at least 4 visits  Clinical Health Psychologist to address issues of relaxation, behavioral change, coping style, and other factors important to improvement: none  Diagnostic Studies: none. I agree with upcoming EMG testing  Medication Management:   No medication changes at this time  Further procedures recommended: schedule lumbar medial branch blocks (test injectins) and proceed to lumbar radiofrequency ablation (burning procedure) as indicated. Our office will contact you. Handouts given today about these procedures  Recommendations/follow-up for PCP:  see above  Release of information: none  Follow up: 12 weeks in-person.     ----------------------------------------------------------------  Clinic Number:  [phone]   Call with any questions about your care and for scheduling assistance.   Calls are returned Monday through Friday between 8 AM and 4:30 PM. We usually get back to you within 2 business days depending on the issue/request.    If we are prescribing your medications:  For opioid medication refills, call the clinic or send a GENIAC message 7 days in advance.  Please include:  Name of requested medication  Name of the pharmacy.  For non-opioid medications, call your pharmacy directly to request a refill. Please allow 3-4 days to be processed.   Per [place] State Law:  All controlled substance prescriptions must be filled within 30 days of being written.    For those controlled substances allowing refills, pickup must occur within 30 days of last fill.      We believe regular attendance is key to your success in our program!    Any time you are unable to keep your appointment we ask that you call us at least 24 hours in advance to cancel.This will allow us to offer the appointment time to another patient.   Multiple missed appointments may lead to dismissal from the clinic.

## 2024-02-09 ENCOUNTER — TELEPHONE (OUTPATIENT)
Dept: PALLIATIVE MEDICINE | Facility: CLINIC | Age: 34
End: 2024-02-09
Payer: COMMERCIAL

## 2024-02-09 DIAGNOSIS — M47.817 LUMBOSACRAL SPONDYLOSIS WITHOUT MYELOPATHY: Primary | ICD-10-CM

## 2024-02-09 NOTE — TELEPHONE ENCOUNTER
Screening questions for MBB Injections:    Injection to be done at which interventional clinic site? [facility] [place]    Procedure ordered by Melani Lujan     Procedure ordered? Lumbar Medial Branch Block- lumbar medial branch blocks proceeding to lumbar RFA as indicated. Likely at L4-5 and L5-S1 but could do L3-4 and L4-5 levels. Final determination made by interventionalist     What insurance would patient like us to bill for this procedure? [employer]      [employer]ICA: REQUIRES A PA FOR BOTH MBB     Worker's comp- Any injection DO NOT SCHEDULE and route to Arina Tam.      [employer] insurance - If scheduling an SI joint injection DO NOT SCHEDULE and route to Latonya Kuo.       MBBs must be scheduled with elapsed time interval of at least 2 weeks and not more than 6 months between the First MBB and the Second MBB for insurance purposes       [employer] - Any injection besides hip/shoulder/knee joint DO NOT SCHEDULE and route to Latonya Kuo. She will obtain PA and call pt back to schedule procedure or notify pt of denial.       [employer]- PA required for all MBB's      **BCBS- ALL need to be routed to Latonya for review if a PA is needed**    IF SCHEDULING IN [place] PAIN OR SPINE PLEASE SCHEDULE AT LEAST 7-10         BUSINESS DAYS OUT SO A PA CAN BE OBTAINED      Genicular Nerve blocks- ALL insurances except for- [employer], Medicare (straight not supplement) and are. Need to be reviewed by Latonya before scheduling.       Is patient scheduled at [facility]? NO    If YES, route every encounter to [facility] CARE NAVIGATION POOL [[phone]]    Any chance of pregnancy? Not Applicable   If YES, do NOT schedule and route to RN pool  - Dr. Marshall route to Netta Stanton and PM&R Nurse  [[id number]]      Is an  needed? No     Patient has a drive home? (mandatory) Yes     Is patient taking any blood thinners (plavix, coumadin, jantoven, warfarin, heparin, pradaxa  or dabigatran )? No    If hold needed, do NOT schedule, route to RN pool/ Dr. Marshall's Team     Is patient taking any aspirin products? No     If more than 325mg/day, OK to schedule; Instruct pt to decrease to less than 325 mg for 7 days AND route to RN pool/ Dr. Marshall's Team    For CERVICAL procedures, hold all aspirin products for 6 days.     Tell pt that if aspirin product is not held for 6 days, the procedure WILL BE cancelled.      Does the patient have a bleeding or clotting disorder? No    If YES, okay to schedule AND route to RN nurse pool/ Dr. Marshall's Team    **For any patients with platelet count <100, must be forwarded to provider**    Is patient diabetic? NO If YES, have them bring their glucometer.    Does patient have an active infection or treated for one within the past week? No    Is patient currently taking any antibiotics?  No    For patients on chronic, preventative, or prophylactic antibiotics, procedures may be scheduled.     For patients on antibiotics for active or recent infection:antibiotic course must have been completed for 4 days    Is patient currently taking any steroid medications? (i.e. Prednisone, Medrol)  No     For patients on steroid medications, course must have been completed for 4 days    Is patient actively being treated for cancer or immunocompromised? No   If YES, do NOT schedule and route to MIESHA/ Dr. Finleys Team    Are you able to get on and off an exam table with minimal or no assistance? Yes   If NO, do NOT schedule and route to MIESHA/ Dr. Finleys Team    Are you able to roll over and lay on your stomach with minimal or no assistance? Yes   If NO, do NOT schedule and route to MIESHA/ Dr. Finleys Team    Any allergies to contrast dye, iodine, shellfish, or numbing and steroid medications? No  (If so, inform nursing and note in scheduling comments.)    Allergies: Patient has no known allergies.     Does patient have an MRI/CT?  YES: 11/24/23  Check Procedure Scheduling Grid  to see if required.      Was the MRI done within the last 3 years?  Yes    If yes, [facility], [facility], [facility] etc?   [facility]     If no, do not schedule and route to nursing/ Dr. Marshall's Team    If MRI was not done at [place], [facility] or [facility] do NOT schedule and route to nursing.      If pt has an imaging disc, the injection MAY be scheduled but pt has to bring disc to appt.     If they show up without the disc the injection cannot be done    Is patient able to transfer to a procedure table with minimal or no assistance? Yes  If NO, do NOT schedule and route to RN/ Dr. Marshall's Team    Medial Branch Block Pre-Procedure Instructions    It is okay to take long acting pain medications (if you are on them) the day of the procedure but try not to take any short acting medications unless absolutely necessary.    YES: Informed    Long acting meds would include: Gabapentin (Neurontin), MS Contin, Oxycontin        Short acting meds would include:  Percocet, Oxycodone, Vicodin, Ibuprofen     The day of the procedure, you should try to do things that provoke your pain, since the injection is being done to see if it will relieve your pain . YES: Informed      If your pain level is a 4 out of 10 or less on the day of the procedu re, please call [phone] to reschedule.  YES: Informed      Reminders:      If you are started on any steroids or antibiotics between now and your appointment, you must contact us because it may affect our ability to perform your procedure - Informed       Instructed pt to arrive 30 minutes early for IV start if required. (Check Procedure Scheduling Grid) INot Applicable       If this is for a cervical MBB aspirin needs to be held for 6 days.  Not Applicable       Do not schedule procedures requiring IV placement in the first appointment of the day or first appointment after lunch. Do NOT schedule at 0745, 0815 or 1245.        For patients 85  or older we recommend having an adult stay w/ them for the remainder of the day.      Does the patient have any questions? NO     Chana Chen      [facility]  Pain Management

## 2024-02-13 ENCOUNTER — THERAPY VISIT (OUTPATIENT)
Dept: PHYSICAL THERAPY | Facility: CLINIC | Age: 34
End: 2024-02-13
Attending: NURSE PRACTITIONER
Payer: COMMERCIAL

## 2024-02-13 DIAGNOSIS — M54.16 LUMBAR RADICULAR PAIN: ICD-10-CM

## 2024-02-13 DIAGNOSIS — M54.59 LUMBAR FACET JOINT PAIN: ICD-10-CM

## 2024-02-13 DIAGNOSIS — M51.369 DDD (DEGENERATIVE DISC DISEASE), LUMBAR: ICD-10-CM

## 2024-02-13 DIAGNOSIS — M47.817 LUMBOSACRAL SPONDYLOSIS WITHOUT MYELOPATHY: ICD-10-CM

## 2024-02-13 PROCEDURE — 97161 PT EVAL LOW COMPLEX 20 MIN: CPT | Mod: GP | Performed by: PHYSICAL THERAPIST

## 2024-02-13 PROCEDURE — 97110 THERAPEUTIC EXERCISES: CPT | Mod: GP | Performed by: PHYSICAL THERAPIST

## 2024-02-13 PROCEDURE — 97112 NEUROMUSCULAR REEDUCATION: CPT | Mod: GP | Performed by: PHYSICAL THERAPIST

## 2024-02-13 NOTE — PROGRESS NOTES
"PHYSICAL THERAPY EVALUATION  Type of Visit: Evaluation    See electronic medical record for Abuse and Falls Screening details.    Subjective       Presenting condition or subjective complaint:    Date of onset: 02/07/24    Relevant medical history:   He has had this pain for about 3 years. He doesn't remember how it began. The right side began first and then extended into the left leg. He denies any pain in to the low back. Pain is present bilateral lateral hips into the anterolateral thighs and extends to the knees. He notes that at times the pain can extend to his feet. Last week that happened and \"I couldn't even move.\" He wants to know if he will have to deal with this his whole life. He has had injections in the past that were not effective, pain was worse.     Living Environment  Social support:   , two children, ages 7 and 11    Employment:    works at a restaurant, prolonged standing and sitting    Patient goals for therapy:  tolerate work duties better, stand longer, sit longer, walk longer, tolerate daily routine better    Pain assessment: Pain present, current 6/10, range 5-10/10     Objective   LUMBAR SPINE EVALUATION  POSTURE: Sitting Posture: Rounded shoulders, Forward head, Thoracic kyphosis increased, note signif lumbar, trunk guarding with sit, standing  GAIT:   Assistive Device(s): None  Gait Deviations: Antalgic  Stride length decreased  Rigid trunk with limited trunk rotation, arm swing  BALANCE/PROPRIOCEPTION: Single Leg Stance Eyes Open (seconds): 5\" R and L  ROM:  lumbar flex 50%, ext 50%  STRENGTH:  able to heel walk, toe walk but with pain and guarding    Assessment & Plan   CLINICAL IMPRESSIONS  Medical Diagnosis: lumbar facet joint pain, lumbar radicular pain    Treatment Diagnosis: lumbar facet joint pain, lumbar radicular pain   Impression/Assessment: Patient is a 33 year old male with chronic LBP, B meralgia paresthetica complaints.  The following significant findings have been " identified: Pain, Decreased ROM/flexibility, Decreased strength, Impaired gait, Impaired muscle performance, Decreased activity tolerance, and Impaired posture. These impairments interfere with their ability to perform self care tasks, work tasks, recreational activities, household chores, household mobility, and community mobility as compared to previous level of function.     Clinical Decision Making (Complexity):  Clinical Presentation: Stable/Uncomplicated  Clinical Presentation Rationale: based on medical and personal factors listed in PT evaluation  Clinical Decision Making (Complexity): Low complexity    PLAN OF CARE  Treatment Interventions:  Interventions: Neuromuscular Re-education, Therapeutic Activity, Therapeutic Exercise, Self-Care/Home Management    Long Term Goals     PT Goal 1  Goal Identifier: standing  Goal Description: able to stand 30 minutes  Rationale: to maximize safety and independence with performance of ADLs and functional tasks;to maximize safety and independence within the home;to maximize safety and independence within the community;to maximize safety and independence with self cares  Target Date: 06/13/24      Frequency of Treatment: 1x wk x 4 wks, 2x month x 3 months  Duration of Treatment: 4 months    Education Assessment:        Risks and benefits of evaluation/treatment have been explained.   Patient/Family/caregiver agrees with Plan of Care.     Evaluation Time:     PT Eval, Low Complexity Minutes (21130): 30     Signing Clinician: Arun Cr, PT      [facility]                                                                                   OUTPATIENT PHYSICAL THERAPY      PLAN OF TREATMENT FOR OUTPATIENT REHABILITATION   Patient's Last Name, First Name, John Tinajero YOB: 1990   Provider's Name   [facility]   Medical Record No.  [id number]     Onset Date: 02/07/24  Start of Care Date:  02/13/24     Medical Diagnosis:  lumbar facet joint pain, lumbar radicular pain      PT Treatment Diagnosis:  lumbar facet joint pain, lumbar radicular pain Plan of Treatment  Frequency/Duration: 1x wk x 4 wks, 2x month x 3 months/ 4 months    Certification date from 02/13/24 to 05/12/24         See note for plan of treatment details and functional goals     Arun Cr, PT                         I CERTIFY THE NEED FOR THESE SERVICES FURNISHED UNDER        THIS PLAN OF TREATMENT AND WHILE UNDER MY CARE     (Physician attestation of this document indicates review and certification of the therapy plan).              Referring Provider:  Melani Lujan    Initial Assessment  See Epic Evaluation- Start of Care Date: 02/13/24

## 2024-02-22 ENCOUNTER — THERAPY VISIT (OUTPATIENT)
Dept: PHYSICAL THERAPY | Facility: CLINIC | Age: 34
End: 2024-02-22
Payer: COMMERCIAL

## 2024-02-22 DIAGNOSIS — M54.59 LUMBAR FACET JOINT PAIN: Primary | ICD-10-CM

## 2024-02-22 PROCEDURE — 97112 NEUROMUSCULAR REEDUCATION: CPT | Mod: GP | Performed by: PHYSICAL THERAPIST

## 2024-02-22 PROCEDURE — 97032 APPL MODALITY 1+ESTIM EA 15: CPT | Mod: GP | Performed by: PHYSICAL THERAPIST

## 2024-02-22 PROCEDURE — 97110 THERAPEUTIC EXERCISES: CPT | Mod: GP | Performed by: PHYSICAL THERAPIST

## 2024-02-26 ENCOUNTER — THERAPY VISIT (OUTPATIENT)
Dept: PHYSICAL THERAPY | Facility: CLINIC | Age: 34
End: 2024-02-26
Payer: COMMERCIAL

## 2024-02-26 DIAGNOSIS — M54.59 LUMBAR FACET JOINT PAIN: Primary | ICD-10-CM

## 2024-02-26 DIAGNOSIS — M51.369 DDD (DEGENERATIVE DISC DISEASE), LUMBAR: ICD-10-CM

## 2024-02-26 DIAGNOSIS — M47.817 LUMBOSACRAL SPONDYLOSIS WITHOUT MYELOPATHY: ICD-10-CM

## 2024-02-26 PROCEDURE — 97110 THERAPEUTIC EXERCISES: CPT | Mod: GP | Performed by: PHYSICAL THERAPIST

## 2024-02-26 PROCEDURE — 97112 NEUROMUSCULAR REEDUCATION: CPT | Mod: GP | Performed by: PHYSICAL THERAPIST

## 2024-02-26 PROCEDURE — 97032 APPL MODALITY 1+ESTIM EA 15: CPT | Mod: GP | Performed by: PHYSICAL THERAPIST

## 2024-02-26 NOTE — PROGRESS NOTES
MA's:  Please mail the order for TENS that printed at [facility] [employer] in [place] to the patient's home.     Thanks.  Melani SCHNEIDER RN CNP, FNP  [facility]

## 2024-02-27 NOTE — PROGRESS NOTES
Mailed out the order for TENS that printed in [place] to the patient's home.    Karsten Enriquez MA

## 2024-02-29 ENCOUNTER — RADIOLOGY INJECTION OFFICE VISIT (OUTPATIENT)
Dept: PALLIATIVE MEDICINE | Facility: CLINIC | Age: 34
End: 2024-02-29
Attending: NURSE PRACTITIONER
Payer: COMMERCIAL

## 2024-02-29 VITALS — OXYGEN SATURATION: 100 % | DIASTOLIC BLOOD PRESSURE: 83 MMHG | SYSTOLIC BLOOD PRESSURE: 124 MMHG | HEART RATE: 61 BPM

## 2024-02-29 DIAGNOSIS — M54.16 LUMBAR RADICULAR PAIN: Primary | ICD-10-CM

## 2024-02-29 DIAGNOSIS — M51.369 DDD (DEGENERATIVE DISC DISEASE), LUMBAR: ICD-10-CM

## 2024-02-29 DIAGNOSIS — M47.817 LUMBOSACRAL SPONDYLOSIS WITHOUT MYELOPATHY: ICD-10-CM

## 2024-02-29 DIAGNOSIS — M54.59 LUMBAR FACET JOINT PAIN: ICD-10-CM

## 2024-02-29 PROCEDURE — 62323 NJX INTERLAMINAR LMBR/SAC: CPT | Performed by: PAIN MEDICINE

## 2024-02-29 RX ORDER — TRIAMCINOLONE ACETONIDE 40 MG/ML
40 INJECTION, SUSPENSION INTRA-ARTICULAR; INTRAMUSCULAR ONCE
Status: COMPLETED | OUTPATIENT
Start: 2024-02-29 | End: 2024-02-29

## 2024-02-29 RX ADMIN — TRIAMCINOLONE ACETONIDE 40 MG: 40 INJECTION, SUSPENSION INTRA-ARTICULAR; INTRAMUSCULAR at 10:16

## 2024-02-29 NOTE — NURSING NOTE
Pre-procedure Intake  If YES to any questions or NO to having a   Please complete laminated checklist and leave on the computer keyboard for Provider, verbally inform provider if able.    For SCS Trial, RFA's or any sedation procedure:  Have you been fasting? NA  If yes, for how long?     Are you taking any any blood thinners such as Coumadin, Warfarin, Jantoven, Pradaxa Xarelto, Eliquis, Edoxaban, Enoxaparin, Lovenox, Heparin, Arixtra, Fondaparinux, or Fragmin? OR Antiplatelet medication such as Plavix, Brilinta, or Effient?   No   If yes, when did you take your last dose?     Do you take aspirin?  No  If cervical procedure, have you held aspirin for 6 days?   NA    Do you have any allergies to contrast dye, iodine, steroid and/or numbing medications? No     Are you currently taking antibiotics or have an active infection?  NO    Have you had a fever/elevated temperature within the past week? NO    Are you currently taking oral steroids? NO    Do you have a ? Yes    Are you pregnant or breastfeeding?  Not Applicable    Have you received the COVID-19 vaccine? No   If yes, was it your 1st, 2nd or only dose needed?   Date of most recent vaccine:     Notify provider and RNs if systolic BP >170, diastolic BP >100, P >100 or O2 sats < 90%

## 2024-02-29 NOTE — NURSING NOTE
Discharge Information    IV Discontiued Time:  NA    Amount of Fluid Infused:  NA    Discharge Criteria = When patient returns to baseline or as per MD order    Consciousness:  Pt is fully awake    Circulation:  BP +/- 20% of pre-procedure level    Respiration:  Patient is able to breathe deeply    O2 Sat:  Patient is able to maintain O2 Sat >92% on room air    Activity:  Moves 4 extremities on command    Ambulation:  Patient is able to stand and walk or stand and pivot into wheelchair    Dressing:  Clean/dry or No Dressing    Notes:   Discharge instructions and AVS given to patient    Patient meets criteria for discharge?  YES    Admitted to PCU?  No    Responsible adult present to accompany patient home?  Yes    Signature/Title:    Carey Lundberg RN  RN Care Coordinator  [facility] [place]

## 2024-02-29 NOTE — PROGRESS NOTES
Pre procedure Diagnosis: Lumbar radiculopathy  Post procedure Diagnosis: Same  Procedure performed: L3-4 interlaminar epidural steroid injection   Anesthesia: none  Complications: none  Operators: James Joshua MD     Indications:   John Roman is a 33 year old male.  The patient has a history of lbp rad to his ant thigh.  Examination shows neg slump.  he has tried conservative treatment including meds/pt/injection .    MRI reviewed  Options/alternatives, benefits and risks were discussed with the patient including but not limited to bleeding, infection, no pain relief, tissue trauma, exposure to radiation, reaction to medications, spinal cord injury, dural puncture, weakness, numbness and headache.  Questions were answered to his satisfaction and he wishes to proceed. Voluntary informed consent was obtained and signed.     Vitals were reviewed: Yes  Allergies were reviewed:  Yes   Medications were reviewed:  Yes   Pre-procedure pain score: 6/10    Procedure:  The patient's medical history, medications, and allergies were reviewed and reconciled.  After obtaining signed informed consent, the patient was brought into the procedure suite and was placed in a prone position on the procedure table.   A Pause for the Cause was performed.  Patient was prepped and draped in the usual sterile fashion.     The L3-4 interspace was identified with AP fluoroscopy.  A total of 2ml of 1% lidocaine was used to anesthetize the skin and subcutaneous tissues for a  midline approach.    A 20gauge 3.5inch Touhy needle was advanced utilizing intermittent AP and Lateral fluoroscopy and air for loss of resistance.  The epidural space was encountered on the first pass without difficulties.  Aspiration for blood and CSF was negative.  Needle position was verified by injecting 1 ml of Omnipaque utilizing real-time fluoroscopy that showed good needle placement and epidural spread without signs of intravascular or intrathecal uptake.   Omnipaque wasted:  9 ml.    Then, after repeated negative aspiration for blood or CSF, a combination of Kenalog 40 mg, Bupivicaine 0.25% 2 ml, diluted with 3 ml of normal saline to a total injectate volume of 6 ml was injected into the epidural space in a slow and incremental fashion and the needle was restyletted and withdrawn.  All injected medications were preservative free.    The injection site was cleaned and a sterile dressing was applied.    The patient tolerated the procedure well without complications and was taken to the recovery room for continued observation.    Images were saved to PACS.    Post-procedure pain score: 6/10  Follow-up includes:   -f/u with referring provider     James Joshua MD  [facility] [place]

## 2024-02-29 NOTE — PATIENT INSTRUCTIONS
[facility]   Procedure Discharge Instructions    Today you saw:    Dr. James Joshua,         You had an:  Epidural steroid injection       Medications used:  Lidocaine   Bupivacaine Omnipaque  Kenalog Normal saline        Be cautious when walking. Numbness and/or weakness in the lower extremities may occur for up to 6-8 hours after the procedure due to effect of the local anesthetic  Do not drive for 6 hours. The effect of the local anesthetic could slow your reflexes.   You may resume your regular activities after 24 hours  Avoid strenuous activity for the first 24 hours  You may shower, however avoid swimming, tub baths or hot tubs for 24 hours following your procedure  You may have a mild to moderate increase in pain for several days following the injection.  It may take up to 14 days for the steroid medication to start working although you may feel the effect as early as a few days after the procedure.     You may use ice packs for 10-15 minutes, 3 to 4 times a day at the injection site for comfort  Do not use heat to painful areas for 6 to 8 hours. This will give the local anesthetic time to wear off and prevent you from accidentally burning your skin.   Unless you have been directed to avoid the use of anti-inflammatory medications (NSAIDS), you may use medications such as ibuprofen, Aleve or Tylenol for pain control if needed.   If you were fasting, you may resume your normal diet and medications after the procedure  If you have diabetes, check your blood sugar more frequently than usual as your blood sugar may be higher than normal for 10-14 days following a steroid injection. Contact your doctor who manages your diabetes if your blood sugar is higher than usual  Possible side effects of steroids that you may experience include flushing, elevated blood pressure, increased appetite, mild headaches and restlessness.  All of these symptoms will get better with time.  If you  experience any of the following, call the Pain Clinic during work hours (Mon-Friday 8-4:30 pm) at [phone] or the Provider Line after hours at [phone]:  -Fever over 100 degree F  -Swelling, bleeding, redness, drainage, warmth at the injection site  -Progressive weakness or numbness in your legs   -Loss of bowel or bladder function  -Unusual new onset of pain that is not improving

## 2024-03-04 ENCOUNTER — THERAPY VISIT (OUTPATIENT)
Dept: PHYSICAL THERAPY | Facility: CLINIC | Age: 34
End: 2024-03-04
Payer: COMMERCIAL

## 2024-03-04 DIAGNOSIS — M54.59 LUMBAR FACET JOINT PAIN: Primary | ICD-10-CM

## 2024-03-04 PROCEDURE — 97530 THERAPEUTIC ACTIVITIES: CPT | Mod: GP | Performed by: PHYSICAL THERAPIST

## 2024-03-04 PROCEDURE — 97110 THERAPEUTIC EXERCISES: CPT | Mod: GP | Performed by: PHYSICAL THERAPIST

## 2024-03-04 PROCEDURE — 97112 NEUROMUSCULAR REEDUCATION: CPT | Mod: GP | Performed by: PHYSICAL THERAPIST

## 2024-03-07 ENCOUNTER — THERAPY VISIT (OUTPATIENT)
Dept: PHYSICAL THERAPY | Facility: CLINIC | Age: 34
End: 2024-03-07
Payer: COMMERCIAL

## 2024-03-07 DIAGNOSIS — M54.59 LUMBAR FACET JOINT PAIN: Primary | ICD-10-CM

## 2024-03-07 PROCEDURE — 97110 THERAPEUTIC EXERCISES: CPT | Mod: GP | Performed by: PHYSICAL THERAPIST

## 2024-03-07 PROCEDURE — 97112 NEUROMUSCULAR REEDUCATION: CPT | Mod: GP | Performed by: PHYSICAL THERAPIST

## 2024-03-19 ENCOUNTER — OFFICE VISIT (OUTPATIENT)
Dept: NEUROLOGY | Facility: CLINIC | Age: 34
End: 2024-03-19
Attending: STUDENT IN AN ORGANIZED HEALTH CARE EDUCATION/TRAINING PROGRAM
Payer: COMMERCIAL

## 2024-03-19 DIAGNOSIS — G57.13 MERALGIA PARESTHETICA OF BOTH LOWER EXTREMITIES: ICD-10-CM

## 2024-03-19 DIAGNOSIS — M54.10 RADICULAR PAIN: ICD-10-CM

## 2024-03-19 NOTE — LETTER
3/19/2024       RE: John Roman  [address] [facility] [address]     Dear Colleague,    Thank you for referring your patient, John Roman, to the  [facility] at [facility]. Please see a copy of my visit note below.        [facility]  Electrodiagnostic Laboratory    Nerve Conduction & EMG Report          Patient:       John Roman  Patient ID:    [id number]  Gender:        Male  YOB: 1990  Age:           32 Years 11 Months        History & Examination:  33 year old with numbness and pain in right more than left lower limbs. Query meralgia paresthetica vs radiculopathy.     Techniques: Motor and sensory conduction studies were done with surface recording electrodes. EMG was done with a concentric needle electrode.      Results:  Nerve conduction studies:   1. Bilateral lateral femoral cutaneous sensory responses are absent.   2. Bilateral sural and superficial peroneal sensory responses are normal.   3. Bilateral peroneal-EDB and tibial-AH motor responses are normal.     Needle EMG of selected proximal and distal right and left lower limb muscles was performed as tabulated below. No abnormal spontaneous activity was observed in the sampled muscles. Motor unit potential morphology and recruitment patterns were normal.     Interpretation:  This is an abnormal study. Lateral femoral cutaneous sensory responses were absent on both sides. Although this finding is suggestive of focal neuropathies affecting the lateral femoral cutaneous nerves, these sensory responses can be difficult to elicit in some normal healthy individuals. As such, this finding would be suggestive, but not diagnostic, of a clinical diagnosis of meralgia paresthetica. There is no evidence of a lumbosacral radiculopathy affecting the lower limbs on the basis of this study. Clinical correlation is recommended.     Vincent Schreiber MD  Department of  Neurology         Sensory NCS      Nerve / Sites Rec. Site Onset Peak NP Amp Ref. PP Amp Dist Anthony Ref. Temp     ms ms  V  V  V cm m/s m/s  C   R SURAL - Lat Mall      Calf Ankle 2.81 3.59 23.1 8.0 25.4 14 49.8 38.0 31.3   L SURAL - Lat Mall      Calf Ankle 3.18 4.01 26.9 8.0 22.4 14 44.1 38.0 31.5   R SUP PERONEAL      Lat Leg Teague 2.50 3.18 19.1  22.4 12.5 50.0 38.0 30.7   L SUP PERONEAL      Lat Leg Teague 2.19 2.86 13.6  15.7 12.5 57.1 38.0 31.6   R LAT FEM CUT      Ing Lig Thigh NR NR NR  NR       L LAT FEM CUT      Ing Lig Thigh NR NR NR  NR           Motor NCS      Nerve / Sites Rec. Site Lat Ref. Amp Ref. Rel Amp Dist Anthony Ref. Dur. Area Temp.     ms ms mV mV % cm m/s m/s ms %  C   R DEEP PERONEAL - EDB      Ankle EDB 3.18 6.00 4.3 2.5 100 8   6.72 100 31.3      FibHead EDB 11.04  4.0  91.8 38 48.3 38.0 7.08 93.7 31.3      Pop Fos EDB 13.02  3.8  88.2 9 45.5 38.0 8.02 115 31.2   L DEEP PERONEAL - EDB      Ankle EDB 3.91 6.00 2.9 2.5 100 8   5.99 100 31.9   R TIBIAL - AH      Ankle AH 3.96 6.00 7.4 4.0 100 8   6.51 100 31.1      Pop Fos AH 13.18  6.2  83.3 39 42.3 38.0 7.66 104 31.1   L TIBIAL - AH      Ankle AH 3.02 6.00 9.9 4.0 100 8   6.15 100 31.1       F  Wave      Nerve Min F Lat Max F Lat Mean FLat Temp.    ms ms ms  C   R TIBIAL 54.37 68.91 57.72 31.1       EMG Summary Table     Spontaneous MUAP Recruitment    IA Fib/PSW Fasc H.F. Amp Dur. PPP Pattern   L. VAST LATERALIS N None None None N N N Normal   L. TIB ANTERIOR N None None None N N N Normal   L. GASTROCN (MED) N None None None N N N Normal   R. VAST LATERALIS N None None None N N N Normal   R. TIB ANTERIOR N None None None N N N Normal   R. PERON LONGUS N None None None N N N Normal   R. GASTROCN (MED) N None None None N N N Normal   R. TIB POSTERIOR N None None None N N N Normal                                  Again, thank you for allowing me to participate in the care of your patient.      Sincerely,    Vincent Schreiber MD

## 2024-03-19 NOTE — PROGRESS NOTES
[facility]  Electrodiagnostic Laboratory    Nerve Conduction & EMG Report          Patient:       John Roman  Patient ID:    [id number]  Gender:        Male  YOB: 1990  Age:           32 Years 11 Months        History & Examination:  33 year old with numbness and pain in right more than left lower limbs. Query meralgia paresthetica vs radiculopathy.     Techniques: Motor and sensory conduction studies were done with surface recording electrodes. EMG was done with a concentric needle electrode.      Results:  Nerve conduction studies:   1. Bilateral lateral femoral cutaneous sensory responses are absent.   2. Bilateral sural and superficial peroneal sensory responses are normal.   3. Bilateral peroneal-EDB and tibial-AH motor responses are normal.     Needle EMG of selected proximal and distal right and left lower limb muscles was performed as tabulated below. No abnormal spontaneous activity was observed in the sampled muscles. Motor unit potential morphology and recruitment patterns were normal.     Interpretation:  This is an abnormal study. Lateral femoral cutaneous sensory responses were absent on both sides. Although this finding is suggestive of focal neuropathies affecting the lateral femoral cutaneous nerves, these sensory responses can be difficult to elicit in some normal healthy individuals. As such, this finding would be suggestive, but not diagnostic, of a clinical diagnosis of meralgia paresthetica. There is no evidence of a lumbosacral radiculopathy affecting the lower limbs on the basis of this study. Clinical correlation is recommended.     Vincent Schreiber MD  Department of Neurology         Sensory NCS      Nerve / Sites Rec. Site Onset Peak NP Amp Ref. PP Amp Dist Anthony Ref. Temp     ms ms  V  V  V cm m/s m/s  C   R SURAL - Lat Mall      Calf Ankle 2.81 3.59 23.1 8.0 25.4 14 49.8 38.0 31.3   L SURAL - Lat Mall      Calf Ankle 3.18 4.01 26.9 8.0 22.4 14 44.1 38.0 31.5    R SUP PERONEAL      Lat Leg Teague 2.50 3.18 19.1  22.4 12.5 50.0 38.0 30.7   L SUP PERONEAL      Lat Leg Teague 2.19 2.86 13.6  15.7 12.5 57.1 38.0 31.6   R LAT FEM CUT      Ing Lig Thigh NR NR NR  NR       L LAT FEM CUT      Ing Lig Thigh NR NR NR  NR           Motor NCS      Nerve / Sites Rec. Site Lat Ref. Amp Ref. Rel Amp Dist Anthony Ref. Dur. Area Temp.     ms ms mV mV % cm m/s m/s ms %  C   R DEEP PERONEAL - EDB      Ankle EDB 3.18 6.00 4.3 2.5 100 8   6.72 100 31.3      FibHead EDB 11.04  4.0  91.8 38 48.3 38.0 7.08 93.7 31.3      Pop Fos EDB 13.02  3.8  88.2 9 45.5 38.0 8.02 115 31.2   L DEEP PERONEAL - EDB      Ankle EDB 3.91 6.00 2.9 2.5 100 8   5.99 100 31.9   R TIBIAL - AH      Ankle AH 3.96 6.00 7.4 4.0 100 8   6.51 100 31.1      Pop Fos AH 13.18  6.2  83.3 39 42.3 38.0 7.66 104 31.1   L TIBIAL - AH      Ankle AH 3.02 6.00 9.9 4.0 100 8   6.15 100 31.1       F  Wave      Nerve Min F Lat Max F Lat Mean FLat Temp.    ms ms ms  C   R TIBIAL 54.37 68.91 57.72 31.1       EMG Summary Table     Spontaneous MUAP Recruitment    IA Fib/PSW Fasc H.F. Amp Dur. PPP Pattern   L. VAST LATERALIS N None None None N N N Normal   L. TIB ANTERIOR N None None None N N N Normal   L. GASTROCN (MED) N None None None N N N Normal   R. VAST LATERALIS N None None None N N N Normal   R. TIB ANTERIOR N None None None N N N Normal   R. PERON LONGUS N None None None N N N Normal   R. GASTROCN (MED) N None None None N N N Normal   R. TIB POSTERIOR N None None None N N N Normal

## 2024-05-04 ENCOUNTER — HEALTH MAINTENANCE LETTER (OUTPATIENT)
Age: 34
End: 2024-05-04

## 2024-05-08 DIAGNOSIS — G57.11 MERALGIA PARAESTHETICA, RIGHT: ICD-10-CM

## 2024-05-08 RX ORDER — NORTRIPTYLINE HYDROCHLORIDE 50 MG/1
50 CAPSULE ORAL AT BEDTIME
Qty: 90 CAPSULE | Refills: 1 | Status: SHIPPED | OUTPATIENT
Start: 2024-05-08 | End: 2024-07-17

## 2024-05-08 NOTE — CONFIDENTIAL NOTE
Medication interaction alert when attempting to sign. Routed to the provider to review and sign.    Ashley Mcnair, RN Care Coordinator   Neurology/Neurosurgery/PM&R/ Pain Management

## 2024-05-08 NOTE — TELEPHONE ENCOUNTER
Medication:  nortriptyline (PAMELOR) 50 MG capsule  Sig: Take 1 capsule (50 mg) by mouth at bedtime   Date last written: 11/08/2023   Dispensed amount: 90 caps  Refills: 1    Requested Pharmacy: [id number] - [place], [place] - [address]     Pt's last office visit: 1/17/24  Next scheduled office visit: 7/17/24      Per the RN/LPN medication refill protocol, writer is unable to refill this request.     MAINOR Jackson, LOR ([facility])

## 2024-07-17 ENCOUNTER — OFFICE VISIT (OUTPATIENT)
Dept: NEUROLOGY | Facility: CLINIC | Age: 34
End: 2024-07-17
Payer: COMMERCIAL

## 2024-07-17 VITALS
WEIGHT: 192 LBS | BODY MASS INDEX: 27.49 KG/M2 | SYSTOLIC BLOOD PRESSURE: 130 MMHG | HEIGHT: 70 IN | DIASTOLIC BLOOD PRESSURE: 76 MMHG | HEART RATE: 68 BPM

## 2024-07-17 DIAGNOSIS — G57.13 MERALGIA PARESTHETICA OF BOTH LOWER EXTREMITIES: ICD-10-CM

## 2024-07-17 DIAGNOSIS — M54.10 RADICULAR PAIN: ICD-10-CM

## 2024-07-17 DIAGNOSIS — G57.11 MERALGIA PARAESTHETICA, RIGHT: Primary | ICD-10-CM

## 2024-07-17 DIAGNOSIS — M79.2 NERVE PAIN: ICD-10-CM

## 2024-07-17 DIAGNOSIS — M54.59 LUMBAR FACET JOINT PAIN: ICD-10-CM

## 2024-07-17 PROCEDURE — 99214 OFFICE O/P EST MOD 30 MIN: CPT | Performed by: STUDENT IN AN ORGANIZED HEALTH CARE EDUCATION/TRAINING PROGRAM

## 2024-07-17 PROCEDURE — G2211 COMPLEX E/M VISIT ADD ON: HCPCS | Performed by: STUDENT IN AN ORGANIZED HEALTH CARE EDUCATION/TRAINING PROGRAM

## 2024-07-17 RX ORDER — PREGABALIN 75 MG/1
75 CAPSULE ORAL 3 TIMES DAILY
Qty: 90 CAPSULE | Refills: 5 | Status: SHIPPED | OUTPATIENT
Start: 2024-07-17

## 2024-07-17 RX ORDER — NORTRIPTYLINE HYDROCHLORIDE 50 MG/1
50 CAPSULE ORAL AT BEDTIME
Qty: 90 CAPSULE | Refills: 1 | Status: SHIPPED | OUTPATIENT
Start: 2024-07-17

## 2024-07-17 RX ORDER — VENLAFAXINE HYDROCHLORIDE 75 MG/1
75 CAPSULE, EXTENDED RELEASE ORAL DAILY
Qty: 90 CAPSULE | Refills: 1 | Status: SHIPPED | OUTPATIENT
Start: 2024-07-17
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
## 2024-07-17 NOTE — NURSING NOTE
"John Roman's goals for this visit include:   Chief Complaint   Patient presents with    RECHECK     Lateral femoral cutaneous syndrome       He requests these members of his care team be copied on today's visit information: yes    PCP: No Ref-Primary, Physician    Referring Provider:  Thea Cameron MD  [facility]  [address] [place] [address]    /76 (BP Location: Right arm, Patient Position: Sitting, Cuff Size: Adult Regular)   Pulse 68   Ht 1.778 m (5' 10\")   Wt 87.1 kg (192 lb)   BMI 27.55 kg/m      Do you need any medication refills at today's visit? No  MAINOR Jackson, LOR ([facility])      "
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
## 2024-07-17 NOTE — PATIENT INSTRUCTIONS
Increase effexor to 75 mg--take it every day    Continue nortriptyline 50 mg at night    Stop gabapentin when you get lyrica    Start 75 mg at night build up to up to 3x a day or 75 in  mg at night     Seeing another pain doctor would be another option    November 13 @ 11

## 2024-11-20 ENCOUNTER — OFFICE VISIT (OUTPATIENT)
Dept: NEUROLOGY | Facility: CLINIC | Age: 34
End: 2024-11-20
Payer: COMMERCIAL

## 2024-11-20 VITALS
DIASTOLIC BLOOD PRESSURE: 83 MMHG | HEART RATE: 79 BPM | BODY MASS INDEX: 27.49 KG/M2 | WEIGHT: 192 LBS | HEIGHT: 70 IN | SYSTOLIC BLOOD PRESSURE: 129 MMHG

## 2024-11-20 DIAGNOSIS — G57.13 MERALGIA PARESTHETICA OF BOTH LOWER EXTREMITIES: Primary | ICD-10-CM

## 2024-11-20 DIAGNOSIS — G57.11 MERALGIA PARAESTHETICA, RIGHT: ICD-10-CM

## 2024-11-20 DIAGNOSIS — M79.2 NERVE PAIN: ICD-10-CM

## 2024-11-20 RX ORDER — NORTRIPTYLINE HYDROCHLORIDE 50 MG/1
50 CAPSULE ORAL AT BEDTIME
Qty: 90 CAPSULE | Refills: 1 | Status: SHIPPED | OUTPATIENT
Start: 2024-11-20

## 2024-11-20 RX ORDER — PREGABALIN 75 MG/1
75 CAPSULE ORAL 2 TIMES DAILY
Qty: 60 CAPSULE | Refills: 5 | Status: SHIPPED | OUTPATIENT
Start: 2024-11-20

## 2024-11-20 NOTE — NURSING NOTE
"John Roman's goals for this visit include:   Chief Complaint   Patient presents with    RECHECK      Meralgia paraesthetica / 4 month follow up       He requests these members of his care team be copied on today's visit information: yes    PCP: No Ref-Primary, Physician    Referring Provider:  Referred Self, MD  No address on file    /83 (BP Location: Right arm, Patient Position: Sitting, Cuff Size: Adult Regular)   Pulse 79   Ht 1.778 m (5' 10\")   Wt 87.1 kg (192 lb)   BMI 27.55 kg/m      Do you need any medication refills at today's visit? No  MAINOR Jackson, LOR ([facility])      "
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
## 2024-11-20 NOTE — PATIENT INSTRUCTIONS
[facility] Patient Portal --would be great if they would see it.      Continue nortriptyline 50 mg at bedtime    Lyrica--continue try to push to 75 mg at night + during day as well.        Let me know if we can support your [place] visit    Other ideas--other pain clinic locally

## 2025-01-13 ENCOUNTER — OFFICE VISIT (OUTPATIENT)
Dept: FAMILY MEDICINE | Facility: CLINIC | Age: 35
End: 2025-01-13
Payer: COMMERCIAL

## 2025-01-13 VITALS
TEMPERATURE: 98.5 F | RESPIRATION RATE: 16 BRPM | SYSTOLIC BLOOD PRESSURE: 138 MMHG | DIASTOLIC BLOOD PRESSURE: 78 MMHG | OXYGEN SATURATION: 98 % | BODY MASS INDEX: 28.54 KG/M2 | HEART RATE: 75 BPM | WEIGHT: 198.9 LBS

## 2025-01-13 DIAGNOSIS — B35.0 TINEA CAPITIS: Primary | ICD-10-CM

## 2025-01-13 PROCEDURE — 99213 OFFICE O/P EST LOW 20 MIN: CPT | Performed by: FAMILY MEDICINE

## 2025-01-13 RX ORDER — PRENATAL VIT 91/IRON/FOLIC/DHA 28-975-200
COMBINATION PACKAGE (EA) ORAL 2 TIMES DAILY
Qty: 30 G | Refills: 0 | Status: SHIPPED | OUTPATIENT
Start: 2025-01-13

## 2025-01-13 ASSESSMENT — PAIN SCALES - GENERAL: PAINLEVEL_OUTOF10: NO PAIN (0)

## 2025-01-13 ASSESSMENT — PATIENT HEALTH QUESTIONNAIRE - PHQ9
SUM OF ALL RESPONSES TO PHQ QUESTIONS 1-9: 4
SUM OF ALL RESPONSES TO PHQ QUESTIONS 1-9: 4
10. IF YOU CHECKED OFF ANY PROBLEMS, HOW DIFFICULT HAVE THESE PROBLEMS MADE IT FOR YOU TO DO YOUR WORK, TAKE CARE OF THINGS AT HOME, OR GET ALONG WITH OTHER PEOPLE: NOT DIFFICULT AT ALL

## 2025-01-13 NOTE — PROGRESS NOTES
"  Assessment & Plan     Tinea capitis  My first visit with patient and previous history reviewed with him and in his chart.  He went to a local [place] 3 days ago to have his haircut.  When he came back a coworker noted a bald area on the back of his head.  Patient typically cuts his own hair and says he has never had anything such as that.  Not particularly itchy.  When I look at the area it is a roughly 2 cm area of either alopecia or broken hairs but the skin is not erythematous or scaly.  Little bit of puffiness and there is some dark around the rim.  So actually think this is early tinea and we are seeing a hyperpigmented leading edge.  But it is not to the point of being thick that I think would require prolonged oral antifungal.  So we will get him started on a topical antifungal and hoping for quick resolution otherwise we want to reevaluate.  No other systemic symptoms or skin rashes.  - terbinafine (LAMISIL) 1 % external cream; Apply topically 2 times daily.          Nicotine/Tobacco Cessation  He reports that he has been smoking cigarettes. He has never used smokeless tobacco.  Nicotine/Tobacco Cessation Plan  Self help information given to patient      BMI  Estimated body mass index is 28.54 kg/m  as calculated from the following:    Height as of 11/20/24: 1.778 m (5' 10\").    Weight as of this encounter: 90.2 kg (198 lb 14.4 oz).         See Patient Instructions    Subjective   John is a 34 year old, presenting for the following health issues:  Derm Problem (Black and white [place] in back of head)        1/13/2025     2:51 PM   Additional Questions   Roomed by Purnima FIELD   Accompanied by self     History of Present Illness       Reason for visit:  Black round in my head  Symptom onset:  1-3 days ago  Symptoms include:  Black round back off my head  Symptom intensity:  Mild  Symptom progression:  Staying the same  Had these symptoms before:  No  What makes it worse:  No?  What makes it better:  No?   He is " taking medications regularly.           Here today for the above      Review of Systems  Constitutional, HEENT, cardiovascular, pulmonary, gi and gu systems are negative, except as otherwise noted.      Objective    /78 (BP Location: Right arm, Patient Position: Sitting, Cuff Size: Adult Large)   Pulse 75   Temp 98.5  F (36.9  C) (Oral)   Resp 16   Wt 90.2 kg (198 lb 14.4 oz)   SpO2 98%   BMI 28.54 kg/m    Body mass index is 28.54 kg/m .  Physical Exam   Alert, pleasant, upbeat, and in no apparent discomfort.  S1 and S2 normal, no murmurs, clicks, gallops or rubs. Regular rate and rhythm. Chest is clear; no wheezes or rales. No edema or JVD.  Exam as above  Past labs reviewed with the patient.             Signed Electronically by: Mary Lacey MD

## 2025-04-29 ENCOUNTER — TELEPHONE (OUTPATIENT)
Dept: PALLIATIVE MEDICINE | Facility: CLINIC | Age: 35
End: 2025-04-29
Payer: COMMERCIAL

## 2025-04-29 NOTE — TELEPHONE ENCOUNTER
Patient was scheduled with provider 4/30 for return visit.     Patient has not been seen since initial visit with provider on 2/7/2024.   Patient appointment needs to be cancelled and patient will need to obtain new referral to re-establish with pain management at this time.       Carey Lundberg RN

## 2025-04-30 DIAGNOSIS — G57.11 MERALGIA PARAESTHETICA, RIGHT: Primary | ICD-10-CM

## 2025-04-30 DIAGNOSIS — M79.2 NERVE PAIN: ICD-10-CM

## 2025-05-01 ENCOUNTER — PATIENT OUTREACH (OUTPATIENT)
Dept: CARE COORDINATION | Facility: CLINIC | Age: 35
End: 2025-05-01
Payer: COMMERCIAL

## 2025-06-23 ENCOUNTER — OFFICE VISIT (OUTPATIENT)
Dept: FAMILY MEDICINE | Facility: OTHER | Age: 35
End: 2025-06-23
Payer: COMMERCIAL

## 2025-06-23 VITALS
DIASTOLIC BLOOD PRESSURE: 60 MMHG | OXYGEN SATURATION: 97 % | SYSTOLIC BLOOD PRESSURE: 98 MMHG | WEIGHT: 194 LBS | HEIGHT: 70 IN | TEMPERATURE: 97.5 F | HEART RATE: 77 BPM | BODY MASS INDEX: 27.77 KG/M2 | RESPIRATION RATE: 18 BRPM

## 2025-06-23 DIAGNOSIS — R10.84 ABDOMINAL PAIN, GENERALIZED: Primary | ICD-10-CM

## 2025-06-23 PROCEDURE — 3074F SYST BP LT 130 MM HG: CPT | Performed by: PHYSICIAN ASSISTANT

## 2025-06-23 PROCEDURE — 99213 OFFICE O/P EST LOW 20 MIN: CPT | Performed by: PHYSICIAN ASSISTANT

## 2025-06-23 PROCEDURE — 1125F AMNT PAIN NOTED PAIN PRSNT: CPT | Performed by: PHYSICIAN ASSISTANT

## 2025-06-23 PROCEDURE — 3078F DIAST BP <80 MM HG: CPT | Performed by: PHYSICIAN ASSISTANT

## 2025-06-23 ASSESSMENT — ANXIETY QUESTIONNAIRES
GAD7 TOTAL SCORE: 1
7. FEELING AFRAID AS IF SOMETHING AWFUL MIGHT HAPPEN: NOT AT ALL
8. IF YOU CHECKED OFF ANY PROBLEMS, HOW DIFFICULT HAVE THESE MADE IT FOR YOU TO DO YOUR WORK, TAKE CARE OF THINGS AT HOME, OR GET ALONG WITH OTHER PEOPLE?: SOMEWHAT DIFFICULT
GAD7 TOTAL SCORE: 1
2. NOT BEING ABLE TO STOP OR CONTROL WORRYING: NOT AT ALL
IF YOU CHECKED OFF ANY PROBLEMS ON THIS QUESTIONNAIRE, HOW DIFFICULT HAVE THESE PROBLEMS MADE IT FOR YOU TO DO YOUR WORK, TAKE CARE OF THINGS AT HOME, OR GET ALONG WITH OTHER PEOPLE: SOMEWHAT DIFFICULT
7. FEELING AFRAID AS IF SOMETHING AWFUL MIGHT HAPPEN: NOT AT ALL
4. TROUBLE RELAXING: SEVERAL DAYS
6. BECOMING EASILY ANNOYED OR IRRITABLE: NOT AT ALL
1. FEELING NERVOUS, ANXIOUS, OR ON EDGE: NOT AT ALL
GAD7 TOTAL SCORE: 1
5. BEING SO RESTLESS THAT IT IS HARD TO SIT STILL: NOT AT ALL
3. WORRYING TOO MUCH ABOUT DIFFERENT THINGS: NOT AT ALL

## 2025-06-23 ASSESSMENT — PAIN SCALES - PAIN ENJOYMENT GENERAL ACTIVITY SCALE (PEG)
PEG_TOTALSCORE: 1.67
AVG_PAIN_PASTWEEK: 5
INTERFERED_GENERAL_ACTIVITY: 0
INTERFERED_GENERAL_ACTIVITY: 0 - DOES NOT INTERFERE
AVG_PAIN_PASTWEEK: 5
INTERFERED_ENJOYMENT_LIFE: 0 - DOES NOT INTERFERE
INTERFERED_ENJOYMENT_LIFE: 0
PEG_TOTALSCORE: 1.67

## 2025-06-23 ASSESSMENT — PATIENT HEALTH QUESTIONNAIRE - PHQ9
SUM OF ALL RESPONSES TO PHQ QUESTIONS 1-9: 9
10. IF YOU CHECKED OFF ANY PROBLEMS, HOW DIFFICULT HAVE THESE PROBLEMS MADE IT FOR YOU TO DO YOUR WORK, TAKE CARE OF THINGS AT HOME, OR GET ALONG WITH OTHER PEOPLE: NOT DIFFICULT AT ALL
SUM OF ALL RESPONSES TO PHQ QUESTIONS 1-9: 9

## 2025-06-23 ASSESSMENT — PAIN SCALES - GENERAL: PAINLEVEL_OUTOF10: MODERATE PAIN (6)

## 2025-06-23 NOTE — PROGRESS NOTES
"Assessment & Plan     ICD-10-CM    1. Abdominal pain, generalized  R10.84         - Patient with 1 week of abdominal pain, diarrhea, headache, not able to eat much but can drink, worsening over the past week   - On exam patient's abdomen is diffusely tender with rebound   - Due to this and other symptoms, I am recommending ED as patient will likely need CT scan and labs that can not be done immediately here in clinic      Patient in agreement       The patient indicates understanding of these issues and agrees with the plan.    Follow up: immediately to ED     Asher Peña PA-C  [facility] - Mac Lopez is a 35 year old, presenting for the following health issues:  Abdominal Pain      6/23/2025     7:47 AM   Additional Questions   Roomed by Jenny   Accompanied by Self         6/23/2025     7:47 AM   Patient Reported Additional Medications   Patient reports taking the following new medications NA     History of Present Illness       Headaches:   Since the patient's last clinic visit, headaches are: no change  The patient is getting headaches:  Most off the day  He is not able to do normal daily activities when he has a migraine.  The patient is taking the following rescue/relief medications:  Ibuprofen (Advil, Motrin)   Patient states \"I get no relief\" from the rescue/relief medications.   The patient is taking the following medications to prevent migraines:  Other  In the past 4 weeks, the patient has gone to an Urgent Care or Emergency Room 0 times times due to headaches.    Reason for visit:  [place] pain diarrhea headache  Symptom onset:  3-7 days ago  Symptom intensity:  Moderate  Symptom progression:  Worsening  Had these symptoms before:  No  What makes it worse:  Eat  What makes it better:  No         1 week   - Diarrhea, stomach pain, and headache   - Can't eat   - Dizzy  - Frontal   - Comes and goes   - Makes blurry vision   - Ibuprofen helps the headache " "          Pain History:  When did you first notice your pain? 1 week    Have you seen anyone else for your pain? No  How has your pain affected your ability to work?   Where in your body do you have pain? Abdominal/Flank Pain  Onset/Duration: 1 week   Description:   Character: Sharp and Cramping  Location: All over   Radiation: Back  Intensity: mild  Progression of Symptoms:  waxing and waning  Accompanying Signs & Symptoms:  Fever/Chills: No  Gas/Bloating: No  Nausea: YES  Vomitting: YES- x2   Diarrhea: YES  Constipation: No  Dysuria or Hematuria: No  History:   Trauma: No  Previous similar pain: No  Previous tests done: none  Precipitating factors:   Does the pain change with:     Food: YES    Bowel Movement: YES- worse     Urination: No   Other factors:  No  Therapies tried and outcome: Tylenol and Ibuprofen                     Review of Systems  Constitutional, neuro, ENT, endocrine, pulmonary, cardiac, gastrointestinal, genitourinary, musculoskeletal, integument and psychiatric systems are negative, except as otherwise noted.      Objective    BP 98/60   Pulse 77   Temp 97.5  F (36.4  C) (Temporal)   Resp 18   Ht 1.785 m (5' 10.28\")   Wt 88 kg (194 lb)   SpO2 97%   BMI 27.62 kg/m    Body mass index is 27.62 kg/m .  Physical Exam   GENERAL APPEARANCE: healthy, alert and no distress  EYES: Eyes grossly normal to inspection, PERRLA, conjunctivae and sclerae without injection or discharge, EOM intact   RESP: Lungs clear to auscultation - no rales, rhonchi or wheezes   CV: Regular rates and rhythm, normal S1 S2, no S3 or S4, no murmur, click or rub, no peripheral edema and peripheral pulses strong and symmetric bilaterally   ABDOMEN: Soft, tender throughout with rebound, no hepatosplenomegaly, no masses and bowel sounds normal   MS: No musculoskeletal defects are noted and gait is age appropriate without ataxia   SKIN: No suspicious lesions or rashes, hydration status appears adeuqate with normal skin turgor "   PSYCH: Alert and oriented x3; speech- coherent , normal rate and volume; able to articulate logical thoughts, able to abstract reason, no tangential thoughts, no hallucinations or delusions, mentation appears normal, Mood is euthymic. Affect is appropriate for this mood state and bright. Thought content is free of suicidal ideation, hallucinations, and delusions. Dress is adequate and upkept. Eye contact is good during conversation.       Diagnostics: none         Signed Electronically by: Katelyn Peña PA-C

## 2025-08-14 ENCOUNTER — RADIOLOGY INJECTION OFFICE VISIT (OUTPATIENT)
Dept: PALLIATIVE MEDICINE | Facility: CLINIC | Age: 35
End: 2025-08-14
Attending: NURSE PRACTITIONER
Payer: COMMERCIAL

## 2025-08-14 VITALS — OXYGEN SATURATION: 98 % | SYSTOLIC BLOOD PRESSURE: 128 MMHG | HEART RATE: 67 BPM | DIASTOLIC BLOOD PRESSURE: 76 MMHG

## 2025-08-14 DIAGNOSIS — M54.16 LUMBAR RADICULAR PAIN: ICD-10-CM

## 2025-08-14 DIAGNOSIS — M54.16 LUMBAR RADICULOPATHY: ICD-10-CM

## 2025-08-14 RX ORDER — DEXAMETHASONE SODIUM PHOSPHATE 10 MG/ML
10 INJECTION, SOLUTION INTRAMUSCULAR; INTRAVENOUS ONCE
Status: COMPLETED | OUTPATIENT
Start: 2025-08-14 | End: 2025-08-14

## 2025-08-14 RX ADMIN — DEXAMETHASONE SODIUM PHOSPHATE 10 MG: 10 INJECTION, SOLUTION INTRAMUSCULAR; INTRAVENOUS at 11:44

## 2025-08-14 ASSESSMENT — PAIN SCALES - GENERAL
PAINLEVEL_OUTOF10: SEVERE PAIN (7)
PAINLEVEL_OUTOF10: MODERATE PAIN (6)

## 2025-08-18 DIAGNOSIS — G89.4 CHRONIC PAIN SYNDROME: ICD-10-CM

## 2025-08-18 DIAGNOSIS — M54.16 LUMBAR RADICULAR PAIN: ICD-10-CM

## 2025-08-18 RX ORDER — TRAMADOL HYDROCHLORIDE 50 MG/1
25-50 TABLET ORAL EVERY 8 HOURS PRN
Qty: 6 TABLET | Refills: 0 | Status: SHIPPED | OUTPATIENT
Start: 2025-08-18 | End: 2025-08-20

## 2025-08-19 ASSESSMENT — PAIN SCALES - PAIN ENJOYMENT GENERAL ACTIVITY SCALE (PEG)
PEG_TOTALSCORE: 5.33
AVG_PAIN_PASTWEEK: 6
INTERFERED_GENERAL_ACTIVITY: 5
INTERFERED_ENJOYMENT_LIFE: 5

## 2025-08-20 ENCOUNTER — OFFICE VISIT (OUTPATIENT)
Dept: PALLIATIVE MEDICINE | Facility: CLINIC | Age: 35
End: 2025-08-20
Attending: NURSE PRACTITIONER
Payer: COMMERCIAL

## 2025-08-20 VITALS — SYSTOLIC BLOOD PRESSURE: 108 MMHG | DIASTOLIC BLOOD PRESSURE: 64 MMHG

## 2025-08-20 DIAGNOSIS — G57.11 MERALGIA PARAESTHETICA, RIGHT: ICD-10-CM

## 2025-08-20 DIAGNOSIS — G89.4 CHRONIC PAIN SYNDROME: Primary | ICD-10-CM

## 2025-08-20 DIAGNOSIS — M54.16 LUMBAR RADICULAR PAIN: ICD-10-CM

## 2025-08-20 DIAGNOSIS — Z79.899 CONTROLLED SUBSTANCE AGREEMENT SIGNED: ICD-10-CM

## 2025-08-20 PROCEDURE — 99213 OFFICE O/P EST LOW 20 MIN: CPT | Performed by: NURSE PRACTITIONER

## 2025-08-20 PROCEDURE — 1125F AMNT PAIN NOTED PAIN PRSNT: CPT | Performed by: NURSE PRACTITIONER

## 2025-08-20 PROCEDURE — 3074F SYST BP LT 130 MM HG: CPT | Performed by: NURSE PRACTITIONER

## 2025-08-20 PROCEDURE — 3078F DIAST BP <80 MM HG: CPT | Performed by: NURSE PRACTITIONER

## 2025-08-20 RX ORDER — TRAMADOL HYDROCHLORIDE 50 MG/1
25-50 TABLET ORAL EVERY 8 HOURS PRN
Qty: 30 TABLET | Refills: 0 | Status: SHIPPED | OUTPATIENT
Start: 2025-08-20

## 2025-08-20 ASSESSMENT — PAIN SCALES - GENERAL: PAINLEVEL_OUTOF10: MODERATE PAIN (6)

## (undated) DEVICE — GLOVE BIOGEL PI ULTRATOUCH G SZ 8.0 42180

## (undated) DEVICE — TRAY PAIN INJECTION 97A 640

## (undated) DEVICE — NDL SPINAL 22GA 3.5" QUINCKE 405181

## (undated) DEVICE — PREP CHLORAPREP W/ORANGE TINT 10.5ML 930715